# Patient Record
Sex: FEMALE | Race: WHITE | Employment: OTHER | URBAN - METROPOLITAN AREA
[De-identification: names, ages, dates, MRNs, and addresses within clinical notes are randomized per-mention and may not be internally consistent; named-entity substitution may affect disease eponyms.]

---

## 2017-01-05 ENCOUNTER — OFFICE VISIT (OUTPATIENT)
Dept: FAMILY MEDICINE CLINIC | Age: 82
End: 2017-01-05

## 2017-01-05 VITALS
SYSTOLIC BLOOD PRESSURE: 134 MMHG | RESPIRATION RATE: 18 BRPM | OXYGEN SATURATION: 95 % | WEIGHT: 154 LBS | DIASTOLIC BLOOD PRESSURE: 74 MMHG | HEIGHT: 66 IN | HEART RATE: 66 BPM | BODY MASS INDEX: 24.75 KG/M2 | TEMPERATURE: 97.9 F

## 2017-01-05 DIAGNOSIS — M25.569 KNEE PAIN, UNSPECIFIED CHRONICITY, UNSPECIFIED LATERALITY: ICD-10-CM

## 2017-01-05 DIAGNOSIS — N32.81 URGENCY-FREQUENCY SYNDROME: Primary | ICD-10-CM

## 2017-01-05 DIAGNOSIS — F03.90 DEMENTIA WITHOUT BEHAVIORAL DISTURBANCE, UNSPECIFIED DEMENTIA TYPE: ICD-10-CM

## 2017-01-05 NOTE — PROGRESS NOTES
Chief Complaint   Patient presents with    Bladder Infection     possible      Health Maintenance reviewed

## 2017-01-05 NOTE — PROGRESS NOTES
Subjective:      Patient :  80 y. o. with dementia  presents today with a chief complaint of dysuria reported by caregiver yesterday - denies complaint this morning. The patient admits to accompanying frequency yesterday. No recent constipation problem. The patient denies nausea, vomiting, and fever. The patient has no history of recent or recurrent UTIs. Some unsteadiness which caregiver blames on knees - patient complains of left knee pain, caregiver states yesterday it was right knee pain. Using aspercreme and ES tylenol. Caregiver believes knee swelling and pain is related to changes in coreg. No known history of arthritis in knees. Past Medical History   Diagnosis Date    Allergic rhinitis     Atrophic vaginitis     Basal cell carcinoma     Constipation     Dementia      lives with caregiver, Brady Quispe    Depression     Essential hypertension     GERD (gastroesophageal reflux disease)     Hypothyroidism      Current Outpatient Prescriptions   Medication Sig    lisinopril (PRINIVIL, ZESTRIL) 2.5 mg tablet Take 1 Tab by mouth daily.  memantine (NAMENDA) 10 mg tablet Take 1 Tab by mouth daily.  levothyroxine (SYNTHROID) 112 mcg tablet Takes 5 days a week. (Not Monday and Friday)    clotrimazole-betamethasone (LOTRISONE) topical cream Apply to external skin irritation 2 times per day as needed    clonazePAM (KLONOPIN) 0.5 mg tablet TAKE 1 TABLET BY MOUTH EVERY EVENING ONLY AS NEEDED FOR ANXIETY (Patient taking differently: TAKE 1 TABLET BY MOUTH EVERY DAY)    ranitidine (ZANTAC) 150 mg tablet TAKE 1 TABLET BY MOUTH TWICE DAILY FOR STOMACH ACID NEEDS APPOINTMENT    carvedilol (COREG) 6.25 mg tablet Take 1 Tab by mouth two (2) times daily (with meals). (Patient taking differently: Take 6.25 mg by mouth daily.)    citalopram (CELEXA) 20 mg tablet TAKE 2 TABLETS BY MOUTH DAILY FOR MOOD    loratadine (CLARITIN) 10 mg tablet Take 10 mg by mouth.     fluticasone (FLONASE) 50 mcg/actuation nasal spray 2 Sprays by Both Nostrils route once.  Calcium Carbonate-Vit D3-Min 600 mg (1,500 mg)-400 unit chew Take  by mouth.  docusate sodium 100 mg tab Take  by mouth.  conjugated estrogens (PREMARIN) 0.625 mg tablet Take 0.625 mg by mouth. Twice weekly    polyethylene glycol (MIRALAX) 17 gram packet Take 17 g by mouth. No current facility-administered medications for this visit. Allergies   Allergen Reactions    Sulfa (Sulfonamide Antibiotics) Not Reported This Time     Social History   Substance Use Topics    Smoking status: Never Smoker    Smokeless tobacco: Never Used    Alcohol use No       ROS per HPI. Objective:     Visit Vitals    /74 (BP 1 Location: Left arm, BP Patient Position: Sitting)    Pulse 66    Temp 97.9 °F (36.6 °C) (Oral)    Resp 18    Ht 5' 6\" (1.676 m)    Wt 154 lb (69.9 kg)    SpO2 95%    BMI 24.86 kg/m2     GEn: poor historian, unreliable answers to exam  Skin: no pallor, normal turgor  HEENT:  Normocephalic, no conjunctival inflammation, pupils equal round and reactive to light, MMM, no oral lesions  Heart: regular rate and rhythm, no murmurs, rubs or gallops  Lungs: no increased respiratory effort, clear to ausculation bilaterally  Abdomen: soft, mild suprapubic tenderness, not distended. Normal bowel sounds. No rebound or guarding. No bruits. Back: no costovertebral angle tenderness  Extremities:knees with bilateral crepitus, no significant effusion  Neuro awake and oriented        Assessment:       ICD-10-CM ICD-9-CM    1. Urgency-frequency syndrome - UA not convincing for UTI - will send culture and follow. N32.81 596.51 AMB POC URINALYSIS DIP STICK AUTO W/O MICRO      CULTURE, URINE   2. Dementia without behavioral disturbance, unspecified dementia type - moderate - severe, would benefit from caregiver's help with toileting. F03.90 294.20    3.  Knee pain, unspecified chronicity, unspecified laterality - exam consistent with arthritis - explained to caregiver very unlikely to be related to medicaiton. M25.569 719.46 XR KNEE LT MAX 2 VWS      XR KNEE RT MAX 2 VWS         Plan:     Orders Placed This Encounter    CULTURE, URINE    XR KNEE LT MAX 2 VWS     Standing Status:   Future     Standing Expiration Date:   2/5/2018     Order Specific Question:   Reason for Exam     Answer:   knee pain     Order Specific Question:   Is Patient Allergic to Contrast Dye? Answer:   No    XR KNEE RT MAX 2 VWS     Standing Status:   Future     Standing Expiration Date:   2/5/2018     Order Specific Question:   Reason for Exam     Answer:   knee pain     Order Specific Question:   Is Patient Allergic to Contrast Dye? Answer:   No    AMB POC URINALYSIS DIP STICK AUTO W/O MICRO       Verbal and written instructions (see AVS) provided. Patient expresses understanding of diagnosis and treatment plan.

## 2017-01-05 NOTE — PATIENT INSTRUCTIONS
Urine Test: About This Test  What is it? A urine test checks the color, clarity (clear or cloudy), odor, concentration, and acidity (pH) of your urine. It also checks your levels of protein, sugar, blood cells, or other substances in your urine. This test is sometimes called a urinalysis. Why is this test done? A urine test may be done:  · To check for a disease or infection of the urinary tract. The urinary tract includes the kidneys, the tubes that carry urine from the kidneys to the bladder (ureters), and the bladder. It also includes the tube that carries urine from the bladder to outside the body (urethra). · To check the treatment of conditions such as diabetes, kidney stones, a urinary tract infection (UTI), high blood pressure, or some kidney or liver diseases. How can you prepare for the test?  · Before the test, don't eat foods that can change the color of your urine. Examples of these include blackberries, beets, and rhubarb. · Don't do heavy exercise before the test.  · Tell your doctor if you are menstruating or close to starting your period. Your doctor may want to wait to do the test.  · Tell your doctor about all the nonprescription and prescription medicines and herbs or other supplements you take. Some of these can affect the results of this test.  What happens during the test?  A urine test can be done in your doctor's office, clinic, or lab. Or you may be asked to collect a urine sample at home. Then you can take it to the office or lab for testing. Clean-catch midstream urine collection  · Wash your hands before you start. · If the cup you are given has a lid, remove it carefully. Set it down with the inner surface up. Don't touch the inside of the cup with your fingers. · Clean the area around your genitals. ¨ For men: Pull back the foreskin, if present. Clean the head of your penis with medicated towelettes or swabs.   ¨ For women: Spread open the genital folds of skin with one hand. Then use medicated towelettes or swabs in your other hand to clean the area where urine comes out (the urethra). Wipe the area from front to back. · Start urinating into the toilet or urinal. A woman should hold apart the genital folds of skin while she urinates. · After the urine has flowed for several seconds, place the cup into the urine stream. Collect about 2 ounces of urine without stopping your flow of urine. · Don't touch the rim of the cup to your genital area. Don't get toilet paper, pubic hair, stool (feces), menstrual blood, or anything else in the urine sample. · Finish urinating into the toilet or urinal.  · Carefully replace and tighten the lid on the cup, and then return it to the lab. If you are collecting the urine at home and can't get it to the lab in an hour, refrigerate it. Double-voided urine sample collection  This method collects the urine your body is making right now. · Urinate into the toilet or urinal. Don't collect any of this urine. · Drink a large glass of water, and wait about 30 to 40 minutes. · Then get a urine sample. Follow the instructions above for collecting a clean-catch urine sample. · Take the urine sample to the lab. If you are collecting the urine at home and can't get it to the lab in an hour, refrigerate it. Follow-up care is a key part of your treatment and safety. Be sure to make and go to all appointments, and call your doctor if you are having problems. It's also a good idea to keep a list of the medicines you take. Ask your doctor when you can expect to have your test results. Where can you learn more? Go to http://rosy-sancho.info/. Enter R266 in the search box to learn more about \"Urine Test: About This Test.\"  Current as of: February 19, 2016  Content Version: 11.1  © 8061-7314 [employer], [employer].  Care instructions adapted under license by [employer] (which disclaims liability or warranty for this information). If you have questions about a medical condition or this instruction, always ask your healthcare professional. [place] [address] any warranty or liability for your use of this information.

## 2017-01-08 LAB — BACTERIA UR CULT: ABNORMAL

## 2017-01-11 ENCOUNTER — TELEPHONE (OUTPATIENT)
Dept: FAMILY MEDICINE CLINIC | Age: 82
End: 2017-01-11

## 2017-01-12 ENCOUNTER — TELEPHONE (OUTPATIENT)
Dept: FAMILY MEDICINE CLINIC | Age: 82
End: 2017-01-12

## 2017-01-12 ENCOUNTER — OFFICE VISIT (OUTPATIENT)
Dept: CARDIOLOGY CLINIC | Age: 82
End: 2017-01-12

## 2017-01-12 VITALS
OXYGEN SATURATION: 95 % | SYSTOLIC BLOOD PRESSURE: 150 MMHG | DIASTOLIC BLOOD PRESSURE: 70 MMHG | BODY MASS INDEX: 24.65 KG/M2 | RESPIRATION RATE: 16 BRPM | HEIGHT: 66 IN | HEART RATE: 70 BPM | WEIGHT: 153.4 LBS

## 2017-01-12 DIAGNOSIS — I10 ESSENTIAL HYPERTENSION: ICD-10-CM

## 2017-01-12 DIAGNOSIS — I50.22 SYSTOLIC CHF, CHRONIC (HCC): Primary | ICD-10-CM

## 2017-01-12 DIAGNOSIS — I48.0 PAROXYSMAL ATRIAL FIBRILLATION (HCC): ICD-10-CM

## 2017-01-12 DIAGNOSIS — F03.90 DEMENTIA WITHOUT BEHAVIORAL DISTURBANCE, UNSPECIFIED DEMENTIA TYPE: ICD-10-CM

## 2017-01-12 RX ORDER — NITROFURANTOIN (MACROCRYSTALS) 100 MG/1
100 CAPSULE ORAL 2 TIMES DAILY
Qty: 14 CAP | Refills: 0 | Status: SHIPPED | OUTPATIENT
Start: 2017-01-12 | End: 2017-01-19

## 2017-01-12 NOTE — TELEPHONE ENCOUNTER
Spoke with [employer]. Script sent to [facility].

## 2017-01-12 NOTE — TELEPHONE ENCOUNTER
Per Dr. Nay Louis notified that left and right knee x rays show arthritis and fluid in both needs. Pt need to see orthopedist.      Mickey Ordonez verbalized understanding and phone number given to her for Dr. Cyndie Fry at [phone].

## 2017-01-12 NOTE — PROGRESS NOTES
Subjective/HPI:     Paolo Menjivar is a 80 y.o. female is here for f/u appt after titrating Coreg and pt having concerns it was causing knee pain/swelling. Pt has been seen by PCP who obtained knee xrays which is showing arthritis and fluid in the right knee. She had no change in her symptoms holding or reducing the Coreg. The patient denies chest pain/ shortness of breath, palpitations, syncope, presyncope or fatigue. PCP Provider  Douglas Vazquez MD  Past Medical History   Diagnosis Date    Allergic rhinitis     Atrophic vaginitis     Basal cell carcinoma     Constipation     Dementia      lives with caregiver, Santo Emery    Depression     Essential hypertension     GERD (gastroesophageal reflux disease)     Hypothyroidism       Past Surgical History   Procedure Laterality Date    Hx cholecystectomy      Hx tonsil and adenoidectomy      Hx hysterectomy       Allergies   Allergen Reactions    Sulfa (Sulfonamide Antibiotics) Not Reported This Time      Family History   Problem Relation Age of Onset    Cancer Other       Current Outpatient Prescriptions   Medication Sig    nitrofurantoin (MACRODANTIN) 100 mg capsule Take 1 Cap by mouth two (2) times a day for 7 days.  lisinopril (PRINIVIL, ZESTRIL) 2.5 mg tablet Take 1 Tab by mouth daily.  memantine (NAMENDA) 10 mg tablet Take 1 Tab by mouth daily.  levothyroxine (SYNTHROID) 112 mcg tablet Takes 5 days a week. (Not Monday and Friday)    clonazePAM (KLONOPIN) 0.5 mg tablet TAKE 1 TABLET BY MOUTH EVERY EVENING ONLY AS NEEDED FOR ANXIETY (Patient taking differently: TAKE 1 TABLET BY MOUTH EVERY DAY)    ranitidine (ZANTAC) 150 mg tablet TAKE 1 TABLET BY MOUTH TWICE DAILY FOR STOMACH ACID NEEDS APPOINTMENT    citalopram (CELEXA) 20 mg tablet TAKE 2 TABLETS BY MOUTH DAILY FOR MOOD    loratadine (CLARITIN) 10 mg tablet Take 10 mg by mouth.     fluticasone (FLONASE) 50 mcg/actuation nasal spray 2 Sprays by Both Nostrils route once.  Calcium Carbonate-Vit D3-Min 600 mg (1,500 mg)-400 unit chew Take  by mouth.  docusate sodium 100 mg tab Take  by mouth.  conjugated estrogens (PREMARIN) 0.625 mg tablet Take 0.625 mg by mouth. Twice weekly    polyethylene glycol (MIRALAX) 17 gram packet Take 17 g by mouth.  carvedilol (COREG) 6.25 mg tablet Take 1 Tab by mouth two (2) times daily (with meals). (Patient taking differently: Take 6.25 mg by mouth daily.)     No current facility-administered medications for this visit. Vitals:    01/12/17 1428 01/12/17 1429   BP: 140/70 150/70   Pulse: 70    Resp: 16    SpO2: 95%    Weight: 153 lb 6.4 oz (69.6 kg)    Height: 5' 6\" (1.676 m)      Social History     Social History    Marital status:      Spouse name: N/A    Number of children: N/A    Years of education: N/A     Occupational History    Not on file. Social History Main Topics    Smoking status: Never Smoker    Smokeless tobacco: Never Used    Alcohol use No    Drug use: Not on file    Sexual activity: Not on file     Other Topics Concern    Not on file     Social History Narrative    Lives with caregiver, Ceci Snow       I have reviewed the nurses notes, vitals, problem list, allergy list, medical history, family, social history and medications. Review of Symptoms:    General: Pt denies excessive weight gain or loss. HEENT: Denies epistaxis and difficulty swallowing. Respiratory: Denies shortness of breath, TONEY, wheezing or stridor. Cardiovascular: Denies precordial pain, palpitations, edema   Gastrointestinal: Denies abdominal pain or blood in stool  Urinary: started abx for UTI  Musculoskeletal: +right knee pain and swelling  Neurologic: Denies new tremor, paresthesias, or sensory motor disturbance  Skin: Denies new rash, itching or texture change.   Psych: Denies depression        Physical Exam:      General: Well developed, in no acute distress, cooperative and alert  HEENT: No carotid bruits, no JVD, trach is midline. Neck Supple, PEERL, EOM intact. Heart:  Normal S1/S2 negative S3 or S4. Regularly irregular, no murmur, gallop or rub.   Respiratory: Clear bilaterally x 4, no wheezing or rales  Abdomen:   Soft, non-tender, no masses, bowel sounds are active.   Extremities:  No edema, normal cap refill, no cyanosis, atraumatic. Neuro: Alert, speech clear. Skin: Skin color is normal. Non diaphoretic  Vascular: 2+ pulses symmetric in all extremities    Cardiographics    ECG: SR with frequent PACs    Results for orders placed or performed in visit on 07/13/16   CARDIAC HOLTER MONITOR, 24 HOURS    Narrative    ECG Monitor/24 hours, Complete    Reason for Holter Monitor   JUNCTIONAL RHYTHM    Heartbeat    Slowest 46  Average 61  Fastest  100          Results:   Underlying Rhythm: Normal sinus rhythm      Atrial Arrhythmias: paroxysmal atrial fibrillation            AV Conduction: normal    Ventricular Arrhythmias: premature ventricular contractions;  frequent     ST Segment Analysis:non-specific changes     Symptom Correlation:  none    Comment:   Paroxysmal atrial fibrillation, no significant pauses. Patty Jean Baptiste MD                   Cardiology Labs:  Lab Results   Component Value Date/Time    Cholesterol, total 219 08/16/2016 12:00 AM    HDL Cholesterol 42 08/16/2016 12:00 AM    LDL, calculated 139 08/16/2016 12:00 AM    Triglyceride 189 08/16/2016 12:00 AM       Lab Results   Component Value Date/Time    Sodium 142 08/16/2016 12:00 AM    Potassium 3.9 08/16/2016 12:00 AM    Chloride 100 08/16/2016 12:00 AM    CO2 26 08/16/2016 12:00 AM    Glucose 128 08/16/2016 12:00 AM    BUN 20 08/16/2016 12:00 AM    Creatinine 1.07 08/16/2016 12:00 AM    BUN/Creatinine ratio 19 08/16/2016 12:00 AM    GFR est AA 55 08/16/2016 12:00 AM    GFR est non-AA 48 08/16/2016 12:00 AM    Calcium 9.2 08/16/2016 12:00 AM    ALT 13 08/16/2016 12:00 AM    AST 20 08/16/2016 12:00 AM    Alk. phosphatase 60 08/16/2016 12:00 AM    Protein, total 6.6 08/16/2016 12:00 AM    Albumin 4.2 08/16/2016 12:00 AM    A-G Ratio 1.8 08/16/2016 12:00 AM           Assessment:     Assessment:     Mireya Askew was seen today for follow-up. Diagnoses and all orders for this visit:    Systolic CHF, chronic (HCC)    Paroxysmal atrial fibrillation (HCC)  -     AMB POC EKG ROUTINE W/ 12 LEADS, INTER & REP    Essential hypertension    Dementia without behavioral disturbance, unspecified dementia type        ICD-10-CM ICD-9-CM    1. Systolic CHF, chronic (HCC) I50.22 428.22      428.0    2. Paroxysmal atrial fibrillation (HCC) I48.0 427.31 AMB POC EKG ROUTINE W/ 12 LEADS, INTER & REP   3. Essential hypertension I10 401.9    4. Dementia without behavioral disturbance, unspecified dementia type F03.90 294.20      Orders Placed This Encounter    AMB POC EKG ROUTINE W/ 12 LEADS, INTER & REP     Order Specific Question:   Reason for Exam:     Answer:   routine        Plan:     Patient presents today for f/u regarding CHF after reducing and holding Coreg d/t concerns that it was causing knee pain and swelling. Xrays showing likely arthritis etiology of her symptoms. No improvement in symptoms off med. I recommended she increase Coreg to 6.25mg po bid, continue Lisinopril. f/u in 2 weeks. Svetlana Womack NP      [facility]    1/12/2017         Agree with note as outlined by  NP. I confirm findings in history and physical exam. No additional findings noted. Agree with plan as outlined above.      Thelma Bueno MD

## 2017-01-13 NOTE — TELEPHONE ENCOUNTER
Pt has appt with Dr. Brenda Major on 1-30-17. They have placed her on the cancellation list and will call her if something sooner becomes available. Advised Ms. Lawrence that she could give her 600mg of Ibuprofen 3 times daily as needed. Ice and elevate legs as much as possible. Suggested that if pain gets intolerable that she could go to Ortho after hours at [facility] between 5pm and 8pm.   verbalized understanding of all instructions.

## 2017-01-24 RX ORDER — CLONAZEPAM 0.5 MG/1
TABLET ORAL
Qty: 90 TAB | Refills: 0 | Status: SHIPPED | OUTPATIENT
Start: 2017-01-24 | End: 2017-04-19 | Stop reason: SDUPTHER

## 2017-01-25 ENCOUNTER — OFFICE VISIT (OUTPATIENT)
Dept: CARDIOLOGY CLINIC | Age: 82
End: 2017-01-25

## 2017-01-25 VITALS
BODY MASS INDEX: 24.88 KG/M2 | RESPIRATION RATE: 18 BRPM | WEIGHT: 154.8 LBS | HEART RATE: 57 BPM | DIASTOLIC BLOOD PRESSURE: 70 MMHG | OXYGEN SATURATION: 97 % | HEIGHT: 66 IN | SYSTOLIC BLOOD PRESSURE: 120 MMHG

## 2017-01-25 DIAGNOSIS — I50.22 SYSTOLIC CHF, CHRONIC (HCC): ICD-10-CM

## 2017-01-25 DIAGNOSIS — I48.0 PAROXYSMAL ATRIAL FIBRILLATION (HCC): Primary | ICD-10-CM

## 2017-01-25 DIAGNOSIS — I10 ESSENTIAL HYPERTENSION: ICD-10-CM

## 2017-01-25 RX ORDER — METOPROLOL SUCCINATE 25 MG/1
25 TABLET, EXTENDED RELEASE ORAL
Qty: 30 TAB | Refills: 6 | Status: SHIPPED | OUTPATIENT
Start: 2017-01-25 | End: 2017-02-08 | Stop reason: SDUPTHER

## 2017-01-25 RX ORDER — IBUPROFEN 200 MG
200 TABLET ORAL
COMMUNITY
End: 2017-02-08

## 2017-01-25 NOTE — PROGRESS NOTES
Irina Tanner NP  Subjective/HPI:     Nasima Stephenson is a 80 y.o. female is here for follow-up for Coreg titration and concerns with exacerbating the pain as a side effect of carvedilol. PCP Provider  Yoly Avelar MD  Past Medical History   Diagnosis Date    Allergic rhinitis     Atrophic vaginitis     Basal cell carcinoma     Constipation     Dementia      lives with caregiver, Sterling Montoya    Depression     Essential hypertension     GERD (gastroesophageal reflux disease)     Hypothyroidism       Past Surgical History   Procedure Laterality Date    Hx cholecystectomy      Hx tonsil and adenoidectomy      Hx hysterectomy       Allergies   Allergen Reactions    Sulfa (Sulfonamide Antibiotics) Not Reported This Time      Family History   Problem Relation Age of Onset    Cancer Other       Current Outpatient Prescriptions   Medication Sig    ibuprofen (ADVIL) 200 mg tablet Take 200 mg by mouth every six (6) hours as needed for Pain.  metoprolol succinate (TOPROL-XL) 25 mg XL tablet Take 1 Tab by mouth nightly. Replaced Coreg    clonazePAM (KLONOPIN) 0.5 mg tablet TAKE 1 TABLET BY MOUTH EVERY EVENING ONLY AS NEEDED FOR ANXIETY    lisinopril (PRINIVIL, ZESTRIL) 2.5 mg tablet Take 1 Tab by mouth daily.  memantine (NAMENDA) 10 mg tablet Take 1 Tab by mouth daily.  levothyroxine (SYNTHROID) 112 mcg tablet Takes 5 days a week. (Not Monday and Friday)    ranitidine (ZANTAC) 150 mg tablet TAKE 1 TABLET BY MOUTH TWICE DAILY FOR STOMACH ACID NEEDS APPOINTMENT    citalopram (CELEXA) 20 mg tablet TAKE 2 TABLETS BY MOUTH DAILY FOR MOOD    loratadine (CLARITIN) 10 mg tablet Take 10 mg by mouth.  fluticasone (FLONASE) 50 mcg/actuation nasal spray 2 Sprays by Both Nostrils route once.  Calcium Carbonate-Vit D3-Min 600 mg (1,500 mg)-400 unit chew Take  by mouth.  docusate sodium 100 mg tab Take  by mouth.  conjugated estrogens (PREMARIN) 0.625 mg tablet Take 0.625 mg by mouth. Twice weekly    polyethylene glycol (MIRALAX) 17 gram packet Take 17 g by mouth. No current facility-administered medications for this visit. Vitals:    01/25/17 0931 01/25/17 0942   BP: 118/70 120/70   Pulse: (!) 57    Resp: 18    SpO2: 97%    Weight: 154 lb 12.8 oz (70.2 kg)    Height: 5' 6\" (1.676 m)      Social History     Social History    Marital status:      Spouse name: N/A    Number of children: N/A    Years of education: N/A     Occupational History    Not on file. Social History Main Topics    Smoking status: Never Smoker    Smokeless tobacco: Never Used    Alcohol use No    Drug use: Not on file    Sexual activity: Not on file     Other Topics Concern    Not on file     Social History Narrative    Lives with caregiver, Celine Sarabia       I have reviewed the nurses notes, vitals, problem list, allergy list, medical history, family, social history and medications. Review of Symptoms:    General: Pt denies excessive weight gain or loss. Reduced ADL's due to leg pain  HEENT: Denies blurred vision, headaches, epistaxis and difficulty swallowing. Respiratory: Denies shortness of breath, TONEY, wheezing or stridor. Cardiovascular: Denies precordial pain, palpitations, edema or PND  Gastrointestinal: Denies poor appetite, indigestion, abdominal pain or blood in stool  Musculoskeletal: continued knee pain  Neurologic: Denies tremor, paresthesias, or sensory motor disturbance  Skin: Denies rash, itching or texture change. Physical Exam:      General: Well developed, in no acute distress, cooperative and alert  HEENT: No carotid bruits, no JVD, trach is midline. Neck Supple, PEERL, EOM intact. Heart:  Normal S1/S2 negative S3 or S4.  Regular, no murmur, gallop or rub.   Respiratory: Clear bilaterally x 4, no wheezing or rales  Abdomen:   Soft, non-tender, no masses, bowel sounds are active.   Extremities:  Bilateral knee pain    Neuro: speech clear, gait slow and cautious   Skin: Skin color is normal. No rashes or lesions. Non diaphoretic  Vascular: 2+ pulses symmetric in all extremities    Cardiographics    ECG: sinus   Results for orders placed or performed in visit on 07/13/16   CARDIAC HOLTER MONITOR, 24 HOURS    Narrative    ECG Monitor/24 hours, Complete    Reason for Holter Monitor   JUNCTIONAL RHYTHM    Heartbeat    Slowest 46  Average 61  Fastest  100          Results:   Underlying Rhythm: Normal sinus rhythm      Atrial Arrhythmias: paroxysmal atrial fibrillation            AV Conduction: normal    Ventricular Arrhythmias: premature ventricular contractions;  frequent     ST Segment Analysis:non-specific changes     Symptom Correlation:  none    Comment:   Paroxysmal atrial fibrillation, no significant pauses. Kim Grimes MD                   Cardiology Labs:  Lab Results   Component Value Date/Time    Cholesterol, total 219 08/16/2016 12:00 AM    HDL Cholesterol 42 08/16/2016 12:00 AM    LDL, calculated 139 08/16/2016 12:00 AM    Triglyceride 189 08/16/2016 12:00 AM       Lab Results   Component Value Date/Time    Sodium 142 08/16/2016 12:00 AM    Potassium 3.9 08/16/2016 12:00 AM    Chloride 100 08/16/2016 12:00 AM    CO2 26 08/16/2016 12:00 AM    Glucose 128 08/16/2016 12:00 AM    BUN 20 08/16/2016 12:00 AM    Creatinine 1.07 08/16/2016 12:00 AM    BUN/Creatinine ratio 19 08/16/2016 12:00 AM    GFR est AA 55 08/16/2016 12:00 AM    GFR est non-AA 48 08/16/2016 12:00 AM    Calcium 9.2 08/16/2016 12:00 AM    Bilirubin, total 0.5 08/16/2016 12:00 AM    ALT 13 08/16/2016 12:00 AM    AST 20 08/16/2016 12:00 AM    Alk. phosphatase 60 08/16/2016 12:00 AM    Protein, total 6.6 08/16/2016 12:00 AM    Albumin 4.2 08/16/2016 12:00 AM    A-G Ratio 1.8 08/16/2016 12:00 AM           Assessment:     Assessment:     Angelica Hodge was seen today for irregular heart beat.     Diagnoses and all orders for this visit:    Paroxysmal atrial fibrillation (HCC)  -     AMB POC EKG ROUTINE W/ 12 LEADS, INTER & REP    Systolic CHF, chronic (HCC)    Essential hypertension    Other orders  -     metoprolol succinate (TOPROL-XL) 25 mg XL tablet; Take 1 Tab by mouth nightly. Replaced Coreg        ICD-10-CM ICD-9-CM    1. Paroxysmal atrial fibrillation (HCC) I48.0 427.31 AMB POC EKG ROUTINE W/ 12 LEADS, INTER & REP   2. Systolic CHF, chronic (HCC) I50.22 428.22      428.0    3. Essential hypertension I10 401.9      Orders Placed This Encounter    AMB POC EKG ROUTINE W/ 12 LEADS, INTER & REP     Order Specific Question:   Reason for Exam:     Answer:   routine    ibuprofen (ADVIL) 200 mg tablet     Sig: Take 200 mg by mouth every six (6) hours as needed for Pain.  metoprolol succinate (TOPROL-XL) 25 mg XL tablet     Sig: Take 1 Tab by mouth nightly. Replaced Coreg     Dispense:  30 Tab     Refill:  6        Plan:     Patient presents with continued reports of bilateral knee pain that has some correlation according to the patient and family members with the use of carvedilol. Up-to-date reports a 1-6% incidence of arthralgias and arthritis with carvedilol versus a less than 1% with metoprolol. Has follow-up with orthopedics,  will change carvedilol to metoprolol XL 25 mg at night and follow-up in 1 month. Both patient and family are in high agreement with this plan. [address] [facility]    1/25/2017         Agree with note as outlined by  NP. I confirm findings in history and physical exam. No additional findings noted. Agree with plan as outlined above.      Alia Massey MD

## 2017-01-25 NOTE — MR AVS SNAPSHOT
Visit Information Date & Time Provider Department Dept. Phone Encounter #  
 1/25/2017  9:30 AM [address], [place] [facility] [phone] [id number] Upcoming Health Maintenance Date Due DTaP/Tdap/Td series (1 - Tdap) 2/1/1953 GLAUCOMA SCREENING Q2Y 2/1/1997 MEDICARE YEARLY EXAM 2/1/1997 Allergies as of 1/25/2017  Review Complete On: 1/25/2017 By: Kim Foley NP Severity Noted Reaction Type Reactions Sulfa (Sulfonamide Antibiotics)  06/21/2016    Not Reported This Time Current Immunizations  Reviewed on 6/6/2016 Name Date Influenza High Dose Vaccine PF 10/3/2016 Pneumococcal Conjugate (PCV-13) 6/6/2016 Pneumococcal Polysaccharide (PPSV-23) 11/3/2014 Zoster Vaccine, Live 12/3/2012 Not reviewed this visit You Were Diagnosed With   
  
 Codes Comments Paroxysmal atrial fibrillation (HCC)    -  Primary ICD-10-CM: I48.0 ICD-9-CM: 427.31 Systolic CHF, chronic (HCC)     ICD-10-CM: I50.22 ICD-9-CM: 428.22, 428.0 Essential hypertension     ICD-10-CM: I10 
ICD-9-CM: 401.9 Vitals BP Pulse Resp Height(growth percentile) Weight(growth percentile) SpO2  
 120/70 (BP 1 Location: Right arm, BP Patient Position: Sitting) (!) 57 18 5' 6\" (1.676 m) 154 lb 12.8 oz (70.2 kg) 97% BMI OB Status Smoking Status 24.99 kg/m2 Hysterectomy Never Smoker Vitals History BMI and BSA Data Body Mass Index Body Surface Area 24.99 kg/m 2 1.81 m 2 Preferred Pharmacy Pharmacy Name Phone [facility] [address] [phone] Your Updated Medication List  
  
   
This list is accurate as of: 1/25/17 10:24 AM.  Always use your most recent med list. ADVIL 200 mg tablet Generic drug:  ibuprofen Take 200 mg by mouth every six (6) hours as needed for Pain. Calcium Carbonate-Vit D3-Min 600 mg (1,500 mg)-400 unit Chew Take  by mouth.  
  
 citalopram 20 mg tablet Commonly known as:  CELEXA  
TAKE 2 TABLETS BY MOUTH DAILY FOR MOOD  
  
 clonazePAM 0.5 mg tablet Commonly known as:  KlonoPIN  
TAKE 1 TABLET BY MOUTH EVERY EVENING ONLY AS NEEDED FOR ANXIETY  
  
 conjugated estrogens 0.625 mg tablet Commonly known as:  PREMARIN Take 0.625 mg by mouth. Twice weekly  
  
 docusate sodium 100 mg Tab Take  by mouth. fluticasone 50 mcg/actuation nasal spray Commonly known as:  Domnick Means 2 Sprays by Both Nostrils route once. levothyroxine 112 mcg tablet Commonly known as:  SYNTHROID Takes 5 days a week. (Not Monday and Friday)  
  
 lisinopril 2.5 mg tablet Commonly known as:  Silvia [place] Take 1 Tab by mouth daily. loratadine 10 mg tablet Commonly known as:  Belkys Baas Take 10 mg by mouth.  
  
 memantine 10 mg tablet Commonly known as:  Sara Pavon Take 1 Tab by mouth daily. metoprolol succinate 25 mg XL tablet Commonly known as:  TOPROL-XL Take 1 Tab by mouth nightly. Replaced Coreg MIRALAX 17 gram packet Generic drug:  polyethylene glycol Take 17 g by mouth. raNITIdine 150 mg tablet Commonly known as:  ZANTAC TAKE 1 TABLET BY MOUTH TWICE DAILY FOR STOMACH ACID NEEDS APPOINTMENT Prescriptions Sent to Pharmacy Refills  
 metoprolol succinate (TOPROL-XL) 25 mg XL tablet 6 Sig: Take 1 Tab by mouth nightly. Replaced Coreg Class: Normal  
 Pharmacy: [address] #: [phone] Route: Oral  
  
We Performed the Following AMB POC EKG ROUTINE W/ 12 LEADS, INTER & REP [43581 CPT(R)] Please provide this summary of care documentation to your next provider. Your primary care clinician is listed as Valentín Mtz. If you have any questions after today's visit, please call [phone].

## 2017-01-29 RX ORDER — FLUTICASONE PROPIONATE 50 MCG
SPRAY, SUSPENSION (ML) NASAL
Qty: 1 BOTTLE | Refills: 11 | Status: SHIPPED | OUTPATIENT
Start: 2017-01-29

## 2017-01-31 RX ORDER — FLUTICASONE PROPIONATE 50 MCG
SPRAY, SUSPENSION (ML) NASAL
Qty: 1 BOTTLE | Refills: 3 | Status: SHIPPED | OUTPATIENT
Start: 2017-01-31 | End: 2017-02-08

## 2017-02-08 ENCOUNTER — OFFICE VISIT (OUTPATIENT)
Dept: FAMILY MEDICINE CLINIC | Age: 82
End: 2017-02-08

## 2017-02-08 VITALS
WEIGHT: 156 LBS | HEART RATE: 69 BPM | OXYGEN SATURATION: 95 % | DIASTOLIC BLOOD PRESSURE: 68 MMHG | BODY MASS INDEX: 25.07 KG/M2 | HEIGHT: 66 IN | SYSTOLIC BLOOD PRESSURE: 126 MMHG | TEMPERATURE: 98.5 F

## 2017-02-08 DIAGNOSIS — F03.90 DEMENTIA WITHOUT BEHAVIORAL DISTURBANCE, UNSPECIFIED DEMENTIA TYPE: ICD-10-CM

## 2017-02-08 DIAGNOSIS — M17.0 ARTHRITIS OF BOTH KNEES: ICD-10-CM

## 2017-02-08 DIAGNOSIS — R41.0 DELIRIUM: Primary | ICD-10-CM

## 2017-02-08 DIAGNOSIS — I48.0 PAROXYSMAL ATRIAL FIBRILLATION (HCC): ICD-10-CM

## 2017-02-08 DIAGNOSIS — N18.30 CHRONIC KIDNEY DISEASE, STAGE III (MODERATE) (HCC): ICD-10-CM

## 2017-02-08 RX ORDER — FLUTICASONE PROPIONATE 50 MCG
2 SPRAY, SUSPENSION (ML) NASAL DAILY
COMMUNITY
End: 2017-06-14 | Stop reason: SDUPTHER

## 2017-02-08 RX ORDER — MELOXICAM 15 MG/1
15 TABLET ORAL DAILY
COMMUNITY
End: 2017-05-05

## 2017-02-08 RX ORDER — METOPROLOL SUCCINATE 25 MG/1
12.5 TABLET, EXTENDED RELEASE ORAL
Qty: 30 TAB | Refills: 6
Start: 2017-02-08 | End: 2017-09-20 | Stop reason: SDUPTHER

## 2017-02-08 NOTE — PROGRESS NOTES
Chief Complaint   Patient presents with   Dari Slight    Altered mental status     Health Maintenance reviewed

## 2017-02-08 NOTE — PROGRESS NOTES
HPI:  80 y.o. With baseline dementia presents with caregiver who reports delirious behavior overnight. Last night was unable to sleep and wandered the halls with caregiver. She was hallucinating. She fell. No fever, no cough, no abdominal pain. Moving bowels and passing urine without a problem. No change in appetite. Bilateral knee pain - had both knees injected 1/30 and felt much better. Now residual pain in left. Taking mobic after breakfast or lunch. HTN - switched from coreg to metoprolol. Taking metoprolol at night and since starting     Past Medical History   Diagnosis Date    Allergic rhinitis     Atrophic vaginitis     Basal cell carcinoma     Constipation     Dementia      lives with caregiver, Windy Quigley    Depression     Essential hypertension     GERD (gastroesophageal reflux disease)     Hypothyroidism        Social History   Substance Use Topics    Smoking status: Never Smoker    Smokeless tobacco: Never Used    Alcohol use No       Outpatient Prescriptions Marked as Taking for the 2/8/17 encounter (Office Visit) with Nabeel Estrada MD   Medication Sig Dispense Refill    meloxicam (MOBIC) 15 mg tablet Take 15 mg by mouth daily.  fluticasone (FLONASE) 50 mcg/actuation nasal spray 2 Sprays by Both Nostrils route daily.  metoprolol succinate (TOPROL-XL) 25 mg XL tablet Take 0.5 Tabs by mouth nightly. Replaced Coreg 30 Tab 6    fluticasone (FLONASE) 50 mcg/actuation nasal spray INSTILL 2 SPRAYS IN EACH NOSTIRL ONCE DAILY 1 Bottle 11    clonazePAM (KLONOPIN) 0.5 mg tablet TAKE 1 TABLET BY MOUTH EVERY EVENING ONLY AS NEEDED FOR ANXIETY 90 Tab 0    lisinopril (PRINIVIL, ZESTRIL) 2.5 mg tablet Take 1 Tab by mouth daily. 30 Tab 3    memantine (NAMENDA) 10 mg tablet Take 1 Tab by mouth daily. 90 Tab 3    levothyroxine (SYNTHROID) 112 mcg tablet Takes 5 days a week.  (Not Monday and Friday) 75 Tab 3    ranitidine (ZANTAC) 150 mg tablet TAKE 1 TABLET BY MOUTH TWICE DAILY FOR STOMACH ACID NEEDS APPOINTMENT 60 Tab 11    citalopram (CELEXA) 20 mg tablet TAKE 2 TABLETS BY MOUTH DAILY FOR MOOD 180 Tab 3    loratadine (CLARITIN) 10 mg tablet Take 10 mg by mouth.  Calcium Carbonate-Vit D3-Min 600 mg (1,500 mg)-400 unit chew Take  by mouth.  docusate sodium 100 mg tab Take  by mouth.  conjugated estrogens (PREMARIN) 0.625 mg tablet Take 0.625 mg by mouth. Twice weekly      polyethylene glycol (MIRALAX) 17 gram packet Take 17 g by mouth. Allergies   Allergen Reactions    Sulfa (Sulfonamide Antibiotics) Not Reported This Time       ROS:  Patient unreliable with symptoms, all history from caregiver    PE:  Visit Vitals    /68 (BP 1 Location: Left arm, BP Patient Position: Sitting)    Pulse 69    Temp 98.5 °F (36.9 °C) (Oral)    Ht 5' 6\" (1.676 m)    Wt 156 lb (70.8 kg)    SpO2 95%    BMI 25.18 kg/m2     Gen: awake no acute distress  Head: normocephalic, atraumatic  Eyes: pupils equal round reactive to light, sclera clear, conjunctiva clear  Oral: moist mucus membranes, no oral lesions, no pharyngeal inflammation or exudate  Resp: no increase work of breathing, lungs clear to ausculation bilaterally, no wheezing, rales or rhonchi  CV: S1, S2 normal.  No murmurs, rubs, or gallops. Abd: soft, not tender, not distended. Normal bowel sounds. .  Neuro: cranial nerves intact, normal strength and movement in all extremities, reflexes and sensation intact and symmetric. Skin: no lesion or rash  Extremities: no cyanosis or edema      Assessment/Plan:  Delirium overnight - no signs of acute illness, check labs for any metabolic derangement or infection. Decrease metoprolol to 12.5mg to be sure she's not getting hypotensive. Discussed use of second clonazepam if unable to sleep tonight and discussed future role for haldol. I doubt meloxicam played any part in the problem - and fixing knee pain actually removes a source of confusion. ICD-10-CM ICD-9-CM    1. Delirium R41.0 780.09 AMB POC URINALYSIS DIP STICK AUTO W/O MICRO      CBC WITH AUTOMATED DIFF      METABOLIC PANEL, COMPREHENSIVE      TSH 3RD GENERATION       Medications Discontinued During This Encounter   Medication Reason    fluticasone (FLONASE) 50 mcg/actuation nasal spray Not A Current Medication    fluticasone (FLONASE) 50 mcg/actuation nasal spray Duplicate Order    ibuprofen (ADVIL) 200 mg tablet Not A Current Medication    metoprolol succinate (TOPROL-XL) 25 mg XL tablet Reorder         Current Outpatient Prescriptions   Medication Sig    meloxicam (MOBIC) 15 mg tablet Take 15 mg by mouth daily.  fluticasone (FLONASE) 50 mcg/actuation nasal spray 2 Sprays by Both Nostrils route daily.  metoprolol succinate (TOPROL-XL) 25 mg XL tablet Take 0.5 Tabs by mouth nightly. Replaced Coreg    fluticasone (FLONASE) 50 mcg/actuation nasal spray INSTILL 2 SPRAYS IN EACH NOSTIRL ONCE DAILY    clonazePAM (KLONOPIN) 0.5 mg tablet TAKE 1 TABLET BY MOUTH EVERY EVENING ONLY AS NEEDED FOR ANXIETY    lisinopril (PRINIVIL, ZESTRIL) 2.5 mg tablet Take 1 Tab by mouth daily.  memantine (NAMENDA) 10 mg tablet Take 1 Tab by mouth daily.  levothyroxine (SYNTHROID) 112 mcg tablet Takes 5 days a week. (Not Monday and Friday)    ranitidine (ZANTAC) 150 mg tablet TAKE 1 TABLET BY MOUTH TWICE DAILY FOR STOMACH ACID NEEDS APPOINTMENT    citalopram (CELEXA) 20 mg tablet TAKE 2 TABLETS BY MOUTH DAILY FOR MOOD    loratadine (CLARITIN) 10 mg tablet Take 10 mg by mouth.  Calcium Carbonate-Vit D3-Min 600 mg (1,500 mg)-400 unit chew Take  by mouth.  docusate sodium 100 mg tab Take  by mouth.  conjugated estrogens (PREMARIN) 0.625 mg tablet Take 0.625 mg by mouth. Twice weekly    polyethylene glycol (MIRALAX) 17 gram packet Take 17 g by mouth. No current facility-administered medications for this visit.         Recommended healthy diet low in carbohydrates, fats, sodium and cholesterol. Recommended regular cardiovascular exercise 3-6 times per week for 30-60 minutes daily. Verbal and written instructions (see AVS) provided. Patient expresses understanding of diagnosis and treatment plan.

## 2017-02-08 NOTE — TELEPHONE ENCOUNTER
Caregiver concerned about medications. States patient has woken up 2 times in the past week in the middle of the night with confusion. Pt fell last night, unsure of injury as caregiver did not witness fall. appt scheduled today with Dr. Kareen Maldonado at 1566.

## 2017-02-08 NOTE — TELEPHONE ENCOUNTER
Patient's caregiver calling to speak with Dr Vazquez Do. she says that patient did not sleep at all on the new meds she was prescribed and was seeing what she should do from here.  She can be reached at [phone]

## 2017-02-08 NOTE — MR AVS SNAPSHOT
Visit Information Date & Time Provider Department Dept. Phone Encounter #  
 2[facility]/2017 11:30 [facility] [phone] [id number] Follow-up Instructions Return in about 1 day (around 2/9/2017). Your Appointments 2/22/2017 11:30 AM  
1 MONTH with Wilfredo Richardson MD  
[facility]) Appt Note: Per Dr Kaylin Belle $0CP REM  
 [address] [facility] [address]  
[phone] [address] [facility] [address] Upcoming Health Maintenance Date Due DTaP/Tdap/Td series (1 - Tdap) 2[birth year]1/1953 GLAUCOMA SCREENING Q2Y 2/1/1997 MEDICARE YEARLY EXAM 2/1/1997 Allergies as of 2/8/2017  Review Complete On: 2/8/2017 By: Sami Buchanan MD  
  
 Severity Noted Reaction Type Reactions Sulfa (Sulfonamide Antibiotics)  06/21/2016    Not Reported This Time Current Immunizations  Reviewed on 6/6/2016 Name Date Influenza High Dose Vaccine PF 10/3/2016 Pneumococcal Conjugate (PCV-13) 6/6/2016 Pneumococcal Polysaccharide (PPSV-23) 11/3/2014 Zoster Vaccine, Live 12/3/2012 Not reviewed this visit You Were Diagnosed With   
  
 Codes Comments Delirium    -  Primary ICD-10-CM: R41.0 ICD-9-CM: 780.09 Vitals BP Pulse Temp Height(growth percentile) Weight(growth percentile) SpO2  
 126/68 (BP 1 Location: Left arm, BP Patient Position: Sitting) 69 98.5 °F (36.9 °C) (Oral) 5' 6\" (1.676 m) 156 lb (70.8 kg) 95% BMI OB Status Smoking Status 25.18 kg/m2 Hysterectomy Never Smoker Vitals History BMI and BSA Data Body Mass Index Body Surface Area  
 25.18 kg/m 2 1.82 m 2 Preferred Pharmacy Pharmacy Name Phone [facility], [address] [phone] Your Updated Medication List  
  
   
 This list is accurate as of: 2/8/17 12:53 PM.  Always use your most recent med list.  
  
  
  
  
 Calcium Carbonate-Vit D3-Min 600 mg (1,500 mg)-400 unit Chew Take  by mouth.  
  
 citalopram 20 mg tablet Commonly known as:  CELEXA  
TAKE 2 TABLETS BY MOUTH DAILY FOR MOOD  
  
 clonazePAM 0.5 mg tablet Commonly known as:  KlonoPIN  
TAKE 1 TABLET BY MOUTH EVERY EVENING ONLY AS NEEDED FOR ANXIETY  
  
 conjugated estrogens 0.625 mg tablet Commonly known as:  PREMARIN Take 0.625 mg by mouth. Twice weekly  
  
 docusate sodium 100 mg Tab Take  by mouth. * fluticasone 50 mcg/actuation nasal spray Commonly known as:  Caffie Euler 2 Sprays by Both Nostrils route daily. * fluticasone 50 mcg/actuation nasal spray Commonly known as:  Caffie Euler INSTILL 2 SPRAYS IN EACH NOSTIRL ONCE DAILY  
  
 levothyroxine 112 mcg tablet Commonly known as:  SYNTHROID Takes 5 days a week. (Not Monday and Friday)  
  
 lisinopril 2.5 mg tablet Commonly known as:  Aundra Fred Take 1 Tab by mouth daily. loratadine 10 mg tablet Commonly known as:  Boy William Take 10 mg by mouth.  
  
 memantine 10 mg tablet Commonly known as:  Grazyna Media Take 1 Tab by mouth daily. metoprolol succinate 25 mg XL tablet Commonly known as:  TOPROL-XL Take 0.5 Tabs by mouth nightly. Replaced Coreg MIRALAX 17 gram packet Generic drug:  polyethylene glycol Take 17 g by mouth. MOBIC 15 mg tablet Generic drug:  meloxicam  
Take 15 mg by mouth daily. raNITIdine 150 mg tablet Commonly known as:  ZANTAC TAKE 1 TABLET BY MOUTH TWICE DAILY FOR STOMACH ACID NEEDS APPOINTMENT * Notice: This list has 2 medication(s) that are the same as other medications prescribed for you. Read the directions carefully, and ask your doctor or other care provider to review them with you. We Performed the Following AMB POC URINALYSIS DIP STICK AUTO W/O MICRO [45587 CPT(R)] CBC WITH AUTOMATED DIFF [77348 CPT(R)] METABOLIC PANEL, COMPREHENSIVE [64081 CPT(R)] TSH 3RD GENERATION [22894 CPT(R)] Follow-up Instructions Return in about 1 day (around 2/9/2017). Patient Instructions We are checking labs to look for any underlying causes of delirium overnight. Decrease metoprolol to 12.5mg tonight. OK to give extra clonazepam if up tonight. In the future we may need to consider haloperidol. Please provide this summary of care documentation to your next provider. Your primary care clinician is listed as Khalida Peters. If you have any questions after today's visit, please call [phone].

## 2017-02-08 NOTE — PATIENT INSTRUCTIONS
We are checking labs to look for any underlying causes of delirium overnight. Decrease metoprolol to 12.5mg tonight. OK to give extra clonazepam if up tonight. In the future we may need to consider haloperidol.

## 2017-02-09 ENCOUNTER — CLINICAL SUPPORT (OUTPATIENT)
Dept: FAMILY MEDICINE CLINIC | Age: 82
End: 2017-02-09

## 2017-02-09 VITALS
OXYGEN SATURATION: 95 % | SYSTOLIC BLOOD PRESSURE: 122 MMHG | BODY MASS INDEX: 24.75 KG/M2 | WEIGHT: 154 LBS | DIASTOLIC BLOOD PRESSURE: 85 MMHG | HEART RATE: 73 BPM | HEIGHT: 66 IN

## 2017-02-09 DIAGNOSIS — Z01.30 BP CHECK: Primary | ICD-10-CM

## 2017-02-09 LAB
ALBUMIN SERPL-MCNC: 3.9 G/DL (ref 3.5–4.7)
ALBUMIN/GLOB SERPL: 1.5 {RATIO} (ref 1.1–2.5)
ALP SERPL-CCNC: 72 IU/L (ref 39–117)
ALT SERPL-CCNC: 16 IU/L (ref 0–32)
AST SERPL-CCNC: 16 IU/L (ref 0–40)
BASOPHILS # BLD AUTO: 0 X10E3/UL (ref 0–0.2)
BASOPHILS NFR BLD AUTO: 0 %
BILIRUB SERPL-MCNC: 0.5 MG/DL (ref 0–1.2)
BILIRUB UR QL STRIP: NEGATIVE
BUN SERPL-MCNC: 21 MG/DL (ref 8–27)
BUN/CREAT SERPL: 22 (ref 11–26)
CALCIUM SERPL-MCNC: 9.1 MG/DL (ref 8.7–10.3)
CHLORIDE SERPL-SCNC: 100 MMOL/L (ref 96–106)
CO2 SERPL-SCNC: 29 MMOL/L (ref 18–29)
CREAT SERPL-MCNC: 0.95 MG/DL (ref 0.57–1)
EOSINOPHIL # BLD AUTO: 0.1 X10E3/UL (ref 0–0.4)
EOSINOPHIL NFR BLD AUTO: 2 %
ERYTHROCYTE [DISTWIDTH] IN BLOOD BY AUTOMATED COUNT: 14.5 % (ref 12.3–15.4)
GLOBULIN SER CALC-MCNC: 2.6 G/DL (ref 1.5–4.5)
GLUCOSE SERPL-MCNC: 107 MG/DL (ref 65–99)
GLUCOSE UR-MCNC: NEGATIVE MG/DL
HCT VFR BLD AUTO: 39.9 % (ref 34–46.6)
HGB BLD-MCNC: 13.2 G/DL (ref 11.1–15.9)
IMM GRANULOCYTES # BLD: 0 X10E3/UL (ref 0–0.1)
IMM GRANULOCYTES NFR BLD: 0 %
INTERPRETATION: NORMAL
KETONES P FAST UR STRIP-MCNC: NEGATIVE MG/DL
LYMPHOCYTES # BLD AUTO: 1.6 X10E3/UL (ref 0.7–3.1)
LYMPHOCYTES NFR BLD AUTO: 31 %
MCH RBC QN AUTO: 30 PG (ref 26.6–33)
MCHC RBC AUTO-ENTMCNC: 33.1 G/DL (ref 31.5–35.7)
MCV RBC AUTO: 91 FL (ref 79–97)
MONOCYTES # BLD AUTO: 0.5 X10E3/UL (ref 0.1–0.9)
MONOCYTES NFR BLD AUTO: 9 %
NEUTROPHILS # BLD AUTO: 3.1 X10E3/UL (ref 1.4–7)
NEUTROPHILS NFR BLD AUTO: 58 %
PH UR STRIP: 7 [PH] (ref 4.6–8)
PLATELET # BLD AUTO: 162 X10E3/UL (ref 150–379)
POTASSIUM SERPL-SCNC: 3.6 MMOL/L (ref 3.5–5.2)
PROT SERPL-MCNC: 6.5 G/DL (ref 6–8.5)
PROT UR QL STRIP: NEGATIVE MG/DL
RBC # BLD AUTO: 4.4 X10E6/UL (ref 3.77–5.28)
SODIUM SERPL-SCNC: 142 MMOL/L (ref 134–144)
SP GR UR STRIP: 1.01 (ref 1–1.03)
TSH SERPL DL<=0.005 MIU/L-ACNC: 2.37 UIU/ML (ref 0.45–4.5)
UA UROBILINOGEN AMB POC: NORMAL (ref 0.2–1)
URINALYSIS CLARITY POC: CLEAR
URINALYSIS COLOR POC: YELLOW
URINE BLOOD POC: NEGATIVE
URINE LEUKOCYTES POC: NEGATIVE
URINE NITRITES POC: NEGATIVE
WBC # BLD AUTO: 5.3 X10E3/UL (ref 3.4–10.8)

## 2017-02-09 NOTE — PROGRESS NOTES
Presents for BP check and to drop of UA ordered by Dr. Anoop Schilling yesterday. UA was normal and BP was 122/85. Daughter notified of both and continue with present medications as ordered. She said Dr. Anoop Schilling stopped the Metoprolol yesterday and that is why she is here. See med list below:  Prior to Admission medications    Medication Sig Start Date End Date Taking? Authorizing Provider   meloxicam (MOBIC) 15 mg tablet Take 15 mg by mouth daily. Yes Historical Provider   fluticasone (FLONASE) 50 mcg/actuation nasal spray 2 Sprays by Both Nostrils route daily. Yes Historical Provider   fluticasone (FLONASE) 50 mcg/actuation nasal spray INSTILL 2 SPRAYS IN EACH NOSTIRL ONCE DAILY 1/29/17  Yes Nabeel Estrada MD   clonazePAM (KLONOPIN) 0.5 mg tablet TAKE 1 TABLET BY MOUTH EVERY EVENING ONLY AS NEEDED FOR ANXIETY 1/24/17  Yes Nabeel Estrada MD   lisinopril (PRINIVIL, ZESTRIL) 2.5 mg tablet Take 1 Tab by mouth daily. 12/6/16  Yes Aby UMANA MD   memantine (NAMENDA) 10 mg tablet Take 1 Tab by mouth daily. 11/17/16  Yes Nabeel Estrada MD   levothyroxine (SYNTHROID) 112 mcg tablet Takes 5 days a week. (Not Monday and Friday) 11/11/16  Yes Nabeel Estrada MD   ranitidine (ZANTAC) 150 mg tablet TAKE 1 TABLET BY MOUTH TWICE DAILY FOR STOMACH ACID NEEDS APPOINTMENT 10/11/16  Yes Nabeel Estrada MD   citalopram (CELEXA) 20 mg tablet TAKE 2 TABLETS BY MOUTH DAILY FOR MOOD 7/18/16  Yes Nabeel Estrada MD   loratadine (CLARITIN) 10 mg tablet Take 10 mg by mouth. Yes Historical Provider   Calcium Carbonate-Vit D3-Min 600 mg (1,500 mg)-400 unit chew Take  by mouth. Yes Historical Provider   docusate sodium 100 mg tab Take  by mouth. Yes Historical Provider   conjugated estrogens (PREMARIN) 0.625 mg tablet Take 0.625 mg by mouth. Twice weekly   Yes Historical Provider   polyethylene glycol (MIRALAX) 17 gram packet Take 17 g by mouth.    Yes Historical Provider   metoprolol succinate (TOPROL-XL) 25 mg XL tablet Take 0.5 Tabs by mouth nightly. Replaced Coreg 2/8/17   Orly Tanner MD     Explained that she will not take the metoprolol as Dr. Keny Eubanks instructed. Also she had questions about taking the Mobic in the morning and the Celexa. She said she would give both after breakfast. I suggested try it one at a time and if she tolerates it with out nausea then move the other to after breakfast also. She voiced understanding.

## 2017-02-10 ENCOUNTER — TELEPHONE (OUTPATIENT)
Dept: FAMILY MEDICINE CLINIC | Age: 82
End: 2017-02-10

## 2017-02-14 NOTE — PROGRESS NOTES
Jody Lawrence notified and verbalized understanding. Jody states that the past couple of nights have been great.

## 2017-02-22 ENCOUNTER — OFFICE VISIT (OUTPATIENT)
Dept: CARDIOLOGY CLINIC | Age: 82
End: 2017-02-22

## 2017-02-22 VITALS
HEIGHT: 66 IN | OXYGEN SATURATION: 96 % | WEIGHT: 152 LBS | SYSTOLIC BLOOD PRESSURE: 140 MMHG | DIASTOLIC BLOOD PRESSURE: 80 MMHG | RESPIRATION RATE: 16 BRPM | BODY MASS INDEX: 24.43 KG/M2 | HEART RATE: 50 BPM

## 2017-02-22 DIAGNOSIS — E03.9 HYPOTHYROIDISM, UNSPECIFIED TYPE: ICD-10-CM

## 2017-02-22 DIAGNOSIS — I10 ESSENTIAL HYPERTENSION: ICD-10-CM

## 2017-02-22 DIAGNOSIS — I48.0 PAROXYSMAL ATRIAL FIBRILLATION (HCC): ICD-10-CM

## 2017-02-22 DIAGNOSIS — I50.22 SYSTOLIC CHF, CHRONIC (HCC): Primary | ICD-10-CM

## 2017-02-22 DIAGNOSIS — F03.90 DEMENTIA WITHOUT BEHAVIORAL DISTURBANCE, UNSPECIFIED DEMENTIA TYPE: ICD-10-CM

## 2017-02-22 NOTE — PROGRESS NOTES
Subjective/HPI:     Kassie Paget is a 80 y.o. female is here for f/u appt. ROS per caregiver d/t dementia. She reports that she was seen by ortho, had injections in knees and is getting p/t. She was also placed on Mobic and has had improvement in her knee pain. She saw Dr Arely Kruse in Feb for a night of confusion and Dr Arely Kruse decreased her Metoprolol to 1/2 tab. So far she has been doing well overall. She denies noted chest pain/ shortness of breath, LE edema, or syncope. PCP Provider  Monserrat Nagel MD  Past Medical History:   Diagnosis Date    Allergic rhinitis     Atrophic vaginitis     Basal cell carcinoma     Constipation     Dementia     lives with caregiver, Kota Stanley    Depression     Essential hypertension     GERD (gastroesophageal reflux disease)     Hypothyroidism       Past Surgical History:   Procedure Laterality Date    HX CHOLECYSTECTOMY      HX HYSTERECTOMY      HX TONSIL AND ADENOIDECTOMY       Allergies   Allergen Reactions    Sulfa (Sulfonamide Antibiotics) Not Reported This Time      Family History   Problem Relation Age of Onset    Cancer Other       Current Outpatient Prescriptions   Medication Sig    meloxicam (MOBIC) 15 mg tablet Take 15 mg by mouth daily.  fluticasone (FLONASE) 50 mcg/actuation nasal spray 2 Sprays by Both Nostrils route daily.  metoprolol succinate (TOPROL-XL) 25 mg XL tablet Take 0.5 Tabs by mouth nightly. Replaced Coreg    fluticasone (FLONASE) 50 mcg/actuation nasal spray INSTILL 2 SPRAYS IN EACH NOSTIRL ONCE DAILY    clonazePAM (KLONOPIN) 0.5 mg tablet TAKE 1 TABLET BY MOUTH EVERY EVENING ONLY AS NEEDED FOR ANXIETY    lisinopril (PRINIVIL, ZESTRIL) 2.5 mg tablet Take 1 Tab by mouth daily.  memantine (NAMENDA) 10 mg tablet Take 1 Tab by mouth daily.  levothyroxine (SYNTHROID) 112 mcg tablet Takes 5 days a week.  (Not Monday and Friday)    ranitidine (ZANTAC) 150 mg tablet TAKE 1 TABLET BY MOUTH TWICE DAILY FOR STOMACH ACID NEEDS APPOINTMENT    citalopram (CELEXA) 20 mg tablet TAKE 2 TABLETS BY MOUTH DAILY FOR MOOD    loratadine (CLARITIN) 10 mg tablet Take 10 mg by mouth.  Calcium Carbonate-Vit D3-Min 600 mg (1,500 mg)-400 unit chew Take  by mouth.  docusate sodium 100 mg tab Take  by mouth.  conjugated estrogens (PREMARIN) 0.625 mg tablet Take 0.625 mg by mouth. Twice weekly    polyethylene glycol (MIRALAX) 17 gram packet Take 17 g by mouth. No current facility-administered medications for this visit. Vitals:    02/22/17 1142 02/22/17 1146   BP: 150/80 140/80   Pulse: (!) 50    Resp: 16    SpO2: 96%    Weight: 152 lb (68.9 kg)    Height: 5' 6\" (1.676 m)      Social History     Social History    Marital status:      Spouse name: N/A    Number of children: N/A    Years of education: N/A     Occupational History    Not on file. Social History Main Topics    Smoking status: Never Smoker    Smokeless tobacco: Never Used    Alcohol use No    Drug use: Not on file    Sexual activity: Not on file     Other Topics Concern    Not on file     Social History Narrative    Lives with caregiver, Judson Denise       I have reviewed the nurses notes, vitals, problem list, allergy list, medical history, family, social history and medications. Review of Symptoms: per caregiver    General: Denies excessive weight gain or loss. HEENT: Denies epistaxis and difficulty swallowing. Respiratory: Denies shortness of breath, TONEY  Cardiovascular: Denies precordial pain or edema   Gastrointestinal: Denies abdominal pain or blood in stool  Urinary: Denies new onset of dysuria, pyuria  Musculoskeletal: Denies worsening knee pain or swelling from muscles or joints  Neurologic: Denies new tremor, paresthesias, or sensory motor disturbance  Skin: Denies new rash, itching or texture change.   Psych: Denies depression        Physical Exam:      General: Well developed, in no acute distress, cooperative and alert  HEENT: No carotid bruits, no JVD, trach is midline. Neck Supple, PEERL, EOM intact. Heart:  Normal S1/S2 negative S3 or S4. irregular, no murmur, gallop or rub.   Respiratory: Clear bilaterally x 4, no wheezing or rales  Abdomen:   Soft, non-tender, no masses, bowel sounds are active.   Extremities:  No edema, normal cap refill, no cyanosis, atraumatic. Neuro: alert, speech clear, gait stable. Skin: Skin color is normal. No rashes or lesions. Non diaphoretic  Vascular: 2+ pulses symmetric in all extremities    Cardiographics    ECG: Atrial rhythm with frequent PACs    Results for orders placed or performed in visit on 07/13/16   CARDIAC HOLTER MONITOR, 24 HOURS    Narrative    ECG Monitor/24 hours, Complete    Reason for Holter Monitor   JUNCTIONAL RHYTHM    Heartbeat    Slowest 46  Average 61  Fastest  100          Results:   Underlying Rhythm: Normal sinus rhythm      Atrial Arrhythmias: paroxysmal atrial fibrillation            AV Conduction: normal    Ventricular Arrhythmias: premature ventricular contractions;  frequent     ST Segment Analysis:non-specific changes     Symptom Correlation:  none    Comment:   Paroxysmal atrial fibrillation, no significant pauses.      Iona Luna MD                   Cardiology Labs:  Lab Results   Component Value Date/Time    Cholesterol, total 219 08/16/2016 12:00 AM    HDL Cholesterol 42 08/16/2016 12:00 AM    LDL, calculated 139 08/16/2016 12:00 AM    Triglyceride 189 08/16/2016 12:00 AM       Lab Results   Component Value Date/Time    Sodium 142 02/08/2017 01:05 PM    Potassium 3.6 02/08/2017 01:05 PM    Chloride 100 02/08/2017 01:05 PM    CO2 29 02/08/2017 01:05 PM    Glucose 107 02/08/2017 01:05 PM    BUN 21 02/08/2017 01:05 PM    Creatinine 0.95 02/08/2017 01:05 PM    BUN/Creatinine ratio 22 02/08/2017 01:05 PM    GFR est AA 63 02/08/2017 01:05 PM    GFR est non-AA 55 02/08/2017 01:05 PM    Calcium 9.1 02/08/2017 01:05 PM    AST (SGOT) 16 02/08/2017 01:05 PM    Alk. phosphatase 72 02/08/2017 01:05 PM    Protein, total 6.5 02/08/2017 01:05 PM    Albumin 3.9 02/08/2017 01:05 PM    A-G Ratio 1.5 02/08/2017 01:05 PM    ALT (SGPT) 16 02/08/2017 01:05 PM           Assessment:     Assessment:     James Singh was seen today for follow-up. Diagnoses and all orders for this visit:    Systolic CHF, chronic (HCC)  -     AMB POC EKG ROUTINE W/ 12 LEADS, INTER & REP  -     2D ECHO COMPLETE ADULT (TTE) W OR WO CONTR; Future    Essential hypertension  -     AMB POC EKG ROUTINE W/ 12 LEADS, INTER & REP  -     2D ECHO COMPLETE ADULT (TTE) W OR WO CONTR; Future    Paroxysmal atrial fibrillation (HCC)  -     AMB POC EKG ROUTINE W/ 12 LEADS, INTER & REP  -     2D ECHO COMPLETE ADULT (TTE) W OR WO CONTR; Future    Dementia without behavioral disturbance, unspecified dementia type  -     AMB POC EKG ROUTINE W/ 12 LEADS, INTER & REP  -     2D ECHO COMPLETE ADULT (TTE) W OR WO CONTR; Future    Hypothyroidism, unspecified type  -     AMB POC EKG ROUTINE W/ 12 LEADS, INTER & REP  -     2D ECHO COMPLETE ADULT (TTE) W OR WO CONTR; Future        ICD-10-CM ICD-9-CM    1. Systolic CHF, chronic (HCC) I50.22 428.22 AMB POC EKG ROUTINE W/ 12 LEADS, INTER & REP     428.0 2D ECHO COMPLETE ADULT (TTE) W OR WO CONTR   2. Essential hypertension I10 401.9 AMB POC EKG ROUTINE W/ 12 LEADS, INTER & REP      2D ECHO COMPLETE ADULT (TTE) W OR WO CONTR   3. Paroxysmal atrial fibrillation (HCC) I48.0 427.31 AMB POC EKG ROUTINE W/ 12 LEADS, INTER & REP      2D ECHO COMPLETE ADULT (TTE) W OR WO CONTR   4. Dementia without behavioral disturbance, unspecified dementia type F03.90 294.20 AMB POC EKG ROUTINE W/ 12 LEADS, INTER & REP      2D ECHO COMPLETE ADULT (TTE) W OR WO CONTR   5.  Hypothyroidism, unspecified type E03.9 244.9 AMB POC EKG ROUTINE W/ 12 LEADS, INTER & REP      2D ECHO COMPLETE ADULT (TTE) W OR WO CONTR     Orders Placed This Encounter    AMB POC EKG ROUTINE W/ 12 LEADS, INTER & REP Order Specific Question:   Reason for Exam:     Answer:   Routine    2D ECHO COMPLETE ADULT (TTE) W OR WO CONTR     Standing Status:   Future     Standing Expiration Date:   8/24/2017     Order Specific Question:   Reason for Exam:     Answer:   EF 40-45%        Plan:     Patient presents today for f/u after change from Coreg to Metoprolol. Pt has seen ortho and knee pain is improved. Caregiver denies new cardiac complaints. She is having an atrial rhythm with frequent hx of PACs. Hx of PAF on monitor in July, previous discussion poor candidate for anticoag d/t cognitive deficits. I will evaluate with an echo. Continue current care and f/u in 6 months unless EF worsening. Henrene Buerger, NP      [facility]    2/22/2017         Agree with note as outlined by  NP. I confirm findings in history and physical exam. No additional findings noted. Agree with plan as outlined above.      José Miguel Vu MD

## 2017-02-22 NOTE — MR AVS SNAPSHOT
Visit Information Date & Time Provider Department Dept. Phone Encounter #  
 2/22/2017 11:30 AM Daren Joe MD [facility] [phone] [id number] Your Appointments 3/1/2017  1:30 PM  
ECHO CARDIOGRAMS 2D with [address] [facility] Appt Note: Per Dr GARDINER $0CP REM 2D ECHO COMPLETE ADULT (TTE) W OR WO CONTR [58187 CPT(R)]  
 [address]  
[phone] [address]  
  
    
 6/14/2017 10:30 AM  
3 MONTH with [address] [facility] Appt Note: Per Dr Burak Crews $0CP REM  
 [address]  
[phone] [address] Upcoming Health Maintenance Date Due DTaP/Tdap/Td series (1 - Tdap) 2[birth year]1/1953 GLAUCOMA SCREENING Q2Y 2/1/1997 MEDICARE YEARLY EXAM 2/1/1997 Allergies as of 2/22/2017  Review Complete On: 2/22/2017 By: Cameron Boyle, NP Severity Noted Reaction Type Reactions Sulfa (Sulfonamide Antibiotics)  06/21/2016    Not Reported This Time Current Immunizations  Reviewed on 6/6/2016 Name Date Influenza High Dose Vaccine PF 10/3/2016 Pneumococcal Conjugate (PCV-13) 6/6/2016 Pneumococcal Polysaccharide (PPSV-23) 11/3/2014 Zoster Vaccine, Live 12/3/2012 Not reviewed this visit You Were Diagnosed With   
  
 Codes Comments Systolic CHF, chronic (HCC)    -  Primary ICD-10-CM: V08.43 ICD-9-CM: 428.22, 428.0 Essential hypertension     ICD-10-CM: I10 
ICD-9-CM: 401.9 Paroxysmal atrial fibrillation (HCC)     ICD-10-CM: I48.0 ICD-9-CM: 427.31 Dementia without behavioral disturbance, unspecified dementia type     ICD-10-CM: F03.90 ICD-9-CM: 294.20 Hypothyroidism, unspecified type     ICD-10-CM: E03.9 ICD-9-CM: 758. 9 Vitals  BP  
  
  
  
  
 140/80 (BP 1 Location: Right arm, BP Patient Position: Sitting) Vitals History BMI and BSA Data Body Mass Index Body Surface Area 24.53 kg/m 2 1.79 m 2 Preferred Pharmacy Pharmacy Name Phone [facility], [address] [phone] Your Updated Medication List  
  
   
This list is accurate as of: 2/22/17 12:21 PM.  Always use your most recent med list.  
  
  
  
  
 Calcium Carbonate-Vit D3-Min 600 mg (1,500 mg)-400 unit Chew Take  by mouth.  
  
 citalopram 20 mg tablet Commonly known as:  CELEXA  
TAKE 2 TABLETS BY MOUTH DAILY FOR MOOD  
  
 clonazePAM 0.5 mg tablet Commonly known as:  KlonoPIN  
TAKE 1 TABLET BY MOUTH EVERY EVENING ONLY AS NEEDED FOR ANXIETY  
  
 conjugated estrogens 0.625 mg tablet Commonly known as:  PREMARIN Take 0.625 mg by mouth. Twice weekly  
  
 docusate sodium 100 mg Tab Take  by mouth. * fluticasone 50 mcg/actuation nasal spray Commonly known as:  Euna Milad 2 Sprays by Both Nostrils route daily. * fluticasone 50 mcg/actuation nasal spray Commonly known as:  Euna Milad INSTILL 2 SPRAYS IN EACH NOSTIRL ONCE DAILY  
  
 levothyroxine 112 mcg tablet Commonly known as:  SYNTHROID Takes 5 days a week. (Not Monday and Friday)  
  
 lisinopril 2.5 mg tablet Commonly known as:  Michelle Estevez Take 1 Tab by mouth daily. loratadine 10 mg tablet Commonly known as:  Austin Brandi Take 10 mg by mouth.  
  
 memantine 10 mg tablet Commonly known as:  Eliot Hopper Take 1 Tab by mouth daily. metoprolol succinate 25 mg XL tablet Commonly known as:  TOPROL-XL Take 0.5 Tabs by mouth nightly. Replaced Coreg MIRALAX 17 gram packet Generic drug:  polyethylene glycol Take 17 g by mouth. MOBIC 15 mg tablet Generic drug:  meloxicam  
Take 15 mg by mouth daily. raNITIdine 150 mg tablet Commonly known as:  ZANTAC TAKE 1 TABLET BY MOUTH TWICE DAILY FOR STOMACH ACID NEEDS APPOINTMENT * Notice: This list has 2 medication(s) that are the same as other medications prescribed for you. Read the directions carefully, and ask your doctor or other care provider to review them with you. We Performed the Following AMB POC EKG ROUTINE W/ 12 LEADS, INTER & REP [56545 CPT(R)] To-Do List   
 Around 02/25/2017 ECHO:  2D ECHO COMPLETE ADULT (TTE) W OR WO CONTR Please provide this summary of care documentation to your next provider. Your primary care clinician is listed as Nabeel Estrada. If you have any questions after today's visit, please call [phone].

## 2017-03-01 ENCOUNTER — CLINICAL SUPPORT (OUTPATIENT)
Dept: CARDIOLOGY CLINIC | Age: 82
End: 2017-03-01

## 2017-03-01 DIAGNOSIS — E03.9 HYPOTHYROIDISM, UNSPECIFIED TYPE: ICD-10-CM

## 2017-03-01 DIAGNOSIS — F03.90 DEMENTIA WITHOUT BEHAVIORAL DISTURBANCE, UNSPECIFIED DEMENTIA TYPE: ICD-10-CM

## 2017-03-01 DIAGNOSIS — I48.0 PAROXYSMAL ATRIAL FIBRILLATION (HCC): ICD-10-CM

## 2017-03-01 DIAGNOSIS — I50.22 SYSTOLIC CHF, CHRONIC (HCC): ICD-10-CM

## 2017-03-01 DIAGNOSIS — I10 ESSENTIAL HYPERTENSION: ICD-10-CM

## 2017-03-07 ENCOUNTER — OFFICE VISIT (OUTPATIENT)
Dept: FAMILY MEDICINE CLINIC | Age: 82
End: 2017-03-07

## 2017-03-07 VITALS
WEIGHT: 151 LBS | TEMPERATURE: 97.7 F | OXYGEN SATURATION: 93 % | HEART RATE: 56 BPM | SYSTOLIC BLOOD PRESSURE: 171 MMHG | HEIGHT: 66 IN | BODY MASS INDEX: 24.27 KG/M2 | RESPIRATION RATE: 16 BRPM | DIASTOLIC BLOOD PRESSURE: 91 MMHG

## 2017-03-07 DIAGNOSIS — Z00.00 ROUTINE GENERAL MEDICAL EXAMINATION AT A HEALTH CARE FACILITY: Primary | ICD-10-CM

## 2017-03-07 DIAGNOSIS — Z13.39 SCREENING FOR ALCOHOLISM: ICD-10-CM

## 2017-03-07 DIAGNOSIS — Z23 ENCOUNTER FOR IMMUNIZATION: ICD-10-CM

## 2017-03-07 DIAGNOSIS — L60.8 TOENAIL DEFORMITY: ICD-10-CM

## 2017-03-07 NOTE — PROGRESS NOTES
This is a Subsequent Medicare Annual Wellness Visit providing Personalized Prevention Plan Services (PPPS)     I have reviewed the patient's medical history in detail and updated the computerized patient record. History   Nasima Stephenson is a 80 y.o. female who presents for Medicare wellness. Has been having some allergies and wants to clarify which she can take. Currently taking 2 Claritin and the occasional Flonase for these allergies. Evidently Zyrtec made her too tired. She wants to report on how her knees are doing. Saw orthopedist he got a steroid injection in both knees it sounds like. Has been doing physical therapy and has been making great strides. We discussed how she will need to continue doing some kind of physical activity in order to protect those knees. Might need a repeat steroid injection. Currently taking Mobic every day but I cautioned about doing this for the long-term. Recommend backing off to as needed once she feels like therapy has worked its magic    Past Medical History:   Diagnosis Date    Allergic rhinitis     Atrophic vaginitis     Basal cell carcinoma     Constipation     Dementia     lives with caregiver, Sterling Montoya    Depression     Essential hypertension     GERD (gastroesophageal reflux disease)     Hypothyroidism       Past Surgical History:   Procedure Laterality Date    HX CHOLECYSTECTOMY      HX HYSTERECTOMY      HX TONSIL AND ADENOIDECTOMY       Current Outpatient Prescriptions   Medication Sig Dispense Refill    diph,pertuss,acel,,tetanus vac,PF, (ADACEL) 2 Lf-(2.5-5-3-5 mcg)-5Lf/0.5 mL syrg vaccine 0.5 mL by IntraMUSCular route once for 1 dose. 0.5 mL 0    meloxicam (MOBIC) 15 mg tablet Take 15 mg by mouth daily.  fluticasone (FLONASE) 50 mcg/actuation nasal spray 2 Sprays by Both Nostrils route daily.  metoprolol succinate (TOPROL-XL) 25 mg XL tablet Take 0.5 Tabs by mouth nightly.  Replaced Coreg 30 Tab 6    fluticasone (FLONASE) 50 mcg/actuation nasal spray INSTILL 2 SPRAYS IN EACH NOSTIRL ONCE DAILY 1 Bottle 11    clonazePAM (KLONOPIN) 0.5 mg tablet TAKE 1 TABLET BY MOUTH EVERY EVENING ONLY AS NEEDED FOR ANXIETY 90 Tab 0    lisinopril (PRINIVIL, ZESTRIL) 2.5 mg tablet Take 1 Tab by mouth daily. 30 Tab 3    memantine (NAMENDA) 10 mg tablet Take 1 Tab by mouth daily. 90 Tab 3    levothyroxine (SYNTHROID) 112 mcg tablet Takes 5 days a week. (Not Monday and Friday) 75 Tab 3    ranitidine (ZANTAC) 150 mg tablet TAKE 1 TABLET BY MOUTH TWICE DAILY FOR STOMACH ACID NEEDS APPOINTMENT 60 Tab 11    citalopram (CELEXA) 20 mg tablet TAKE 2 TABLETS BY MOUTH DAILY FOR MOOD 180 Tab 3    loratadine (CLARITIN) 10 mg tablet Take 10 mg by mouth.  Calcium Carbonate-Vit D3-Min 600 mg (1,500 mg)-400 unit chew Take  by mouth.  docusate sodium 100 mg tab Take  by mouth.  conjugated estrogens (PREMARIN) 0.625 mg tablet Take 0.625 mg by mouth. Twice weekly      polyethylene glycol (MIRALAX) 17 gram packet Take 17 g by mouth.        Allergies   Allergen Reactions    Sulfa (Sulfonamide Antibiotics) Not Reported This Time     Family History   Problem Relation Age of Onset    Cancer Other      Social History   Substance Use Topics    Smoking status: Never Smoker    Smokeless tobacco: Never Used    Alcohol use No     Patient Active Problem List   Diagnosis Code    Essential hypertension I10    Depression F32.9    Dementia F03.90    GERD (gastroesophageal reflux disease) K21.9    Allergic rhinitis J30.9    Atrophic vaginitis N95.2    Hypothyroidism E03.9    Advanced care planning/counseling discussion Z71.89    Chronic kidney disease, stage III (moderate) M36.7    Systolic CHF, chronic (HCC) I50.22    Paroxysmal atrial fibrillation (HCC) I48.0    Arthritis of both knees M19.90       Depression Risk Factor Screening:     PHQ 2 / 9, over the last two weeks 6/6/2016   Little interest or pleasure in doing things Several days   Feeling down, depressed or hopeless Several days   Total Score PHQ 2 2     Alcohol Risk Factor Screening: On any occasion during the past 3 months, have you had more than 3 drinks containing alcohol? No    Do you average more than 7 drinks per week? No      Functional Ability and Level of Safety:     Hearing Loss   none    Activities of Daily Living   Self-care. Requires assistance with: no ADLs    Fall Risk     Fall Risk Assessment, last 12 mths 3/7/2017   Able to walk? Yes   Fall in past 12 months? No   Fall with injury? -   Number of falls in past 12 months -   Fall Risk Score -     Abuse Screen   Patient is not abused    Review of Systems   ROS:  As listed in HPI. In addition:  Constitutional:   No headache, fever, fatigue, weight loss or weight gain      Eyes:   No redness, pruritis, pain, visual changes, swelling, or discharge      Ears:    No pain, loss or changes in hearing     Cardiac:    No chest pain      Resp:   No cough or shortness of breath      Neuro   No loss of consciousness, dizziness, seizure    Physical Examination     Evaluation of Cognitive Function:  Mood/affect:  happy  Appearance: age appropriate  Family member/caregiver input: Feels like she is doing well    Physical Exam:  Blood pressure (!) 171/91, pulse (!) 56, temperature 97.7 °F (36.5 °C), resp. rate 16, height 5' 6\" (1.676 m), weight 151 lb (68.5 kg), SpO2 93 %. GEN: No apparent distress. Alert and oriented and responds to all questions appropriately. EYES:  Conjunctiva clear; pupils round and reactive to light; extraocular movements are intact. EAR: External ears are normal.  Tympanic membranes are clear and without effusion. NOSE: Turbinates are congested. Some drainage  OROPHYARYNX: No oral lesions or exudates.   NECK:  Supple; no masses; thyroid normal           LUNGS: Respirations unlabored; clear to auscultation bilaterally  CARDIOVASCULAR: Regular, rate, and rhythm without murmurs   ABDOMEN: Soft; nontender; nondistended; normoactive bowel sounds; no masses or organomegaly  NEUROLOGIC:  No focal neurologic deficits. Strength and sensation grossly intact. Coordination and gait grossly intact. EXT: Well perfused. No edema. SKIN: No obvious rashes. Patient Care Team:  Yoly Avelar MD as PCP - General (Internal Medicine)  Lauren Jimenez MD as Physician (Cardiology)    Advice/Referrals/Counseling   Education and counseling provided:    Due for glaucoma screening    Due for tetanus shot, otherwise up-to-date on vaccines    Advance care planning, she is accompanied by her caregiver who is in in law today. Her son is believed to be the power of  but neither of them know if this is written down on paper. They also do not know if she has an advanced directive in place. I recommend that she get this set up before dementia becomes too advanced. Invite her to come back and speak with nurse navigator and make sure this is all set up    Assessment/Plan   Accompanied by her in law Sterling Montoya (I am treating her )    Health maintenance as above  No need for labs today, just had these done    Taking clonazepam nightly for sleep, cautioned against using this every day. Would prefer she only use this as needed    Mobic currently being used every day, when her knee is feeling better with the help of physical therapy she should back this off to as needed    Having some allergies, continue Flonase, can try switching from Claritin to [place]. Dementia is their primary concern, patient has noticed that she is having trouble with short-term memory. She has been taking Namenda for about 3 years now, might consider adding Aricept    She is having trouble reaching down to get to her toes to clip thempodiatry consult    Blood pressure is a little elevated today. This is unusual for her. Did not come down on repeat.   Return in about 2 weeks to recheck, might be a little elevated because she is feeling the effects of allergies today      ICD-10-CM ICD-9-CM    1. Routine general medical examination at a health care facility Z00.00 V70.0    2. Screening for alcoholism Z13.89 V79.1 NE ANNUAL ALCOHOL SCREEN 15 MIN   3. Encounter for immunization Z23 V03.89 diph,pertuss,acel,,tetanus vac,PF, (ADACEL) 2 Lf-(2.5-5-3-5 mcg)-5Lf/0.5 mL syrg vaccine   4.  Toenail deformity L60.8 703.9 REFERRAL TO PODIATRY

## 2017-03-07 NOTE — PATIENT INSTRUCTIONS
Medicare Part B Preventive Services Limitations Recommendation Scheduled   Bone Mass Measurement  (age 72 & older, biennial) Requires diagnosis related to osteoporosis or estrogen deficiency. Biennial benefit unless patient has history of long-term glucocorticoid tx or baseline is needed because initial test was by other method     Cardiovascular Screening Blood Tests (every 5 years)  Total cholesterol, HDL, Triglycerides Order as a panel if possible     Colorectal Cancer Screening  -Fecal occult blood test (annual)  -Flexible sigmoidoscopy (5y)  -Screening colonoscopy (10y)  -Barium Enema      Counseling to Prevent Tobacco Use (up to 8 sessions per year)  - Counseling greater than 3 and up to 10 minutes  - Counseling greater than 10 minutes Patients must be asymptomatic of tobacco-related conditions to receive as preventive service     Diabetes Screening Tests (at least every 3 years, Medicare covers annually or at 6-month intervals for prediabetic patients)    Fasting blood sugar (FBS) or glucose tolerance test (GTT) Patient must be diagnosed with one of the following:  -Hypertension, Dyslipidemia, obesity, previous impaired FBS or GTT  Or any two of the following: overweight, FH of diabetes, age ? 72, history of gestational diabetes, birth of baby weighing more than 9 pounds     Diabetes Self-Management Training (DSMT) (no USPSTF recommendation) Requires referral by treating physician for patient with diabetes or renal disease. 10 hours of initial DSMT session of no less than 30 minutes each in a continuous 12-month period. 2 hours of follow-up DSMT in subsequent years.      Glaucoma Screening (no USPSTF recommendation) Diabetes mellitus, family history, , age 48 or over,  American, age 72 or over     Human Immunodeficiency Virus (HIV) Screening (annually for increased risk patients)  HIV-1 and HIV-2 by EIA, RITA, rapid antibody test, or oral mucosa transudate Patient must be at increased risk for HIV infection per USPSTF guidelines or pregnant. Tests covered annually for patients at increased risk. Pregnant patients may receive up to 3 test during pregnancy. Medical Nutrition Therapy (MNT) (for diabetes or renal disease not recommended schedule) Requires referral by treating physician for patient with diabetes or renal disease. Can be provided in same year as diabetes self-management training (DSMT), and CMS recommends medical nutrition therapy take place after DSMT. Up to 3 hours for initial year and 2 hours in subsequent years. Prostate Cancer Screening (annually up to age 76)  - Digital rectal exam (FATMATA)  - Prostate specific antigen (PSA) Annually (age 48 or over), FATMATA not paid separately when covered E/M service is provided on same date     Seasonal Influenza Vaccination (annually)      Pneumococcal Vaccination (once after 72)      Hepatitis B Vaccinations (if medium/high risk) Medium/high risk factors:  End-stage renal disease,  Hemophiliacs who received Factor VIII or IX concentrates, Clients of institutions for the mentally retarded, Persons who live in the same house as a HepB virus carrier, Homosexual men, Illicit injectable drug abusers. Screening Mammography (biennial age 54-69)? Annually (age 36 or over)     Screening Pap Tests and Pelvic Examination (up to age 79 and after 79 if unknown history or abnormal study last 10 years) Every 25 months except high risk     Ultrasound Screening for Abdominal Aortic Aneurysm (AAA) (once) Patient must be referred through IPPE and not have had a screening for abdominal aortic aneurysm before under Medicare.   Limited to patients who meet one of the following criteria:  - Men who are 73-68 years old and have smoked more than 100 cigarettes in their lifetime.  -Anyone with a FH of AAA  -Anyone recommended for screening by USPSTF     Family Practice Management 2011    Health Maintenance Due   Topic Date Due    DTaP/Tdap/Td  (1 - Tdap) 02/01/1953    Glaucoma Screening   02/01/1997    Annual Well Visit  02/01/1997

## 2017-03-07 NOTE — Clinical Note
PCP referral, has an appointment. The big thing with her is to make sure that the power of  is set up. I think she wants her son to do it but she is not sure if he has medical power of .   She has dementia that will be progressing

## 2017-03-07 NOTE — MR AVS SNAPSHOT
Visit Information Date & Time Provider Department Dept. Phone Encounter #  
 3/7/2017  2:20 PM Travon Canela MD [facility] [phone] [id number] Your Appointments 3/20/2017  2:30 PM  
MEETING with NURSE NAVIGATOR MARIELLA Char Petersonrubyandrea [place] [address] ([address]) Appt Note: ADVANCED CARE PLANNING  
 [address], [facility] [address] [facility] [place] [address]  
[phone]  
  
   
 [address], [place] [address] [address] 6/14/2017 10:30 AM  
3 MONTH with MD [facility] [address]) Appt Note: Per Dr Feliz Sidhu $0CP REM  
 [address]  
[phone] [address] Upcoming Health Maintenance Date Due DTaP/Tdap/Td series (1 - Tdap) 2/1/1953 GLAUCOMA SCREENING Q2Y 2/1/1997 MEDICARE YEARLY EXAM 2/1/1997 Allergies as of 3/7/2017  Review Complete On: 3/7/2017 By: Travon Canela MD  
  
 Severity Noted Reaction Type Reactions Sulfa (Sulfonamide Antibiotics)  06/21/2016    Not Reported This Time Current Immunizations  Reviewed on 6/6/2016 Name Date Influenza High Dose Vaccine PF 10/3/2016 Pneumococcal Conjugate (PCV-13) 6/6/2016 Pneumococcal Polysaccharide (PPSV-23) 11/3/2014 Zoster Vaccine, Live 12/3/2012 Not reviewed this visit You Were Diagnosed With   
  
 Codes Comments Routine general medical examination at a health care facility    -  Primary ICD-10-CM: Z00.00 ICD-9-CM: V70.0 Screening for alcoholism     ICD-10-CM: Z13.89 ICD-9-CM: V79.1 Encounter for immunization     ICD-10-CM: M10 ICD-9-CM: V03.89 Toenail deformity     ICD-10-CM: L60.8 ICD-9-CM: 703.9 Vitals BP Pulse Temp Resp Height(growth percentile) Weight(growth percentile)  (!) 173/96 (BP 1 Location: Left arm, BP Patient Position: Sitting) (!) 56 97.7 °F (36.5 °C) 16 5' 6\" (1.676 m) 151 lb (68.5 kg) SpO2 BMI OB Status Smoking Status 93% 24.37 kg/m2 Hysterectomy Never Smoker BMI and BSA Data Body Mass Index Body Surface Area  
 24.37 kg/m 2 1.79 m 2 Preferred Pharmacy Pharmacy Name Phone [facility], [address] [phone] Your Updated Medication List  
  
   
This list is accurate as of: 3/7/17  3:36 PM.  Always use your most recent med list.  
  
  
  
  
 Calcium Carbonate-Vit D3-Min 600 mg (1,500 mg)-400 unit Chew Take  by mouth.  
  
 citalopram 20 mg tablet Commonly known as:  CELEXA  
TAKE 2 TABLETS BY MOUTH DAILY FOR MOOD  
  
 clonazePAM 0.5 mg tablet Commonly known as:  KlonoPIN  
TAKE 1 TABLET BY MOUTH EVERY EVENING ONLY AS NEEDED FOR ANXIETY  
  
 conjugated estrogens 0.625 mg tablet Commonly known as:  PREMARIN Take 0.625 mg by mouth. Twice weekly  
  
 diph,pertuss(acel),tetanus vac(PF) 2 Lf-(2.5-5-3-5 mcg)-5Lf/0.5 mL Syrg vaccine Commonly known as:  ADACEL  
0.5 mL by IntraMUSCular route once for 1 dose. docusate sodium 100 mg Tab Take  by mouth. * fluticasone 50 mcg/actuation nasal spray Commonly known as:  Chase Merles 2 Sprays by Both Nostrils route daily. * fluticasone 50 mcg/actuation nasal spray Commonly known as:  Chase Merles INSTILL 2 SPRAYS IN EACH NOSTIRL ONCE DAILY  
  
 levothyroxine 112 mcg tablet Commonly known as:  SYNTHROID Takes 5 days a week. (Not Monday and Friday)  
  
 lisinopril 2.5 mg tablet Commonly known as:  Inna Shames Take 1 Tab by mouth daily. loratadine 10 mg tablet Commonly known as:  Clarence Seal Take 10 mg by mouth.  
  
 memantine 10 mg tablet Commonly known as:  Dariela Sheller Take 1 Tab by mouth daily. metoprolol succinate 25 mg XL tablet Commonly known as:  TOPROL-XL Take 0.5 Tabs by mouth nightly. Replaced Coreg MIRALAX 17 gram packet Generic drug:  polyethylene glycol Take 17 g by mouth. MOBIC 15 mg tablet Generic drug:  meloxicam  
Take 15 mg by mouth daily. raNITIdine 150 mg tablet Commonly known as:  ZANTAC TAKE 1 TABLET BY MOUTH TWICE DAILY FOR STOMACH ACID NEEDS APPOINTMENT * Notice: This list has 2 medication(s) that are the same as other medications prescribed for you. Read the directions carefully, and ask your doctor or other care provider to review them with you. Prescriptions Printed Refills diph,pertuss,acel,,tetanus vac,PF, (ADACEL) 2 Lf-(2.5-5-3-5 mcg)-5Lf/0.5 mL syrg vaccine 0 Si.5 mL by IntraMUSCular route once for 1 dose. Class: Print Route: IntraMUSCular We Performed the Following LA ANNUAL ALCOHOL SCREEN 15 MIN [id number] [facility]] REFERRAL TO PODIATRY [REF90 Custom] Referral Information Referral ID Referred By Referred To  
  
 [id number] [place], [address] [facility] Kaitlynn Bernal [address] [place] [address] [place], [address] Visits Status Start Date End Date 1 New Request 3/7/17 3/7/18 If your referral has a status of pending review or denied, additional information will be sent to support the outcome of this decision. Patient Instructions Medicare Part B Preventive Services Limitations Recommendation Scheduled Bone Mass Measurement 
(age 72 & older, biennial) Requires diagnosis related to osteoporosis or estrogen deficiency. Biennial benefit unless patient has history of long-term glucocorticoid tx or baseline is needed because initial test was by other method Cardiovascular Screening Blood Tests (every 5 years) Total cholesterol, HDL, Triglycerides Order as a panel if possible Colorectal Cancer Screening 
-Fecal occult blood test (annual) -Flexible sigmoidoscopy (5y) 
-Screening colonoscopy (10y) -Barium Enema Counseling to Prevent Tobacco Use (up to 8 sessions per year) - Counseling greater than 3 and up to 10 minutes - Counseling greater than 10 minutes Patients must be asymptomatic of tobacco-related conditions to receive as preventive service Diabetes Screening Tests (at least every 3 years, Medicare covers annually or at 6-month intervals for prediabetic patients) Fasting blood sugar (FBS) or glucose tolerance test (GTT) Patient must be diagnosed with one of the following: 
-Hypertension, Dyslipidemia, obesity, previous impaired FBS or GTT 
Or any two of the following: overweight, FH of diabetes, age ? 72, history of gestational diabetes, birth of baby weighing more than 9 pounds Diabetes Self-Management Training (DSMT) (no USPSTF recommendation) Requires referral by treating physician for patient with diabetes or renal disease. 10 hours of initial DSMT session of no less than 30 minutes each in a continuous 12-month period. 2 hours of follow-up DSMT in subsequent years. Glaucoma Screening (no USPSTF recommendation) Diabetes mellitus, family history, , age 48 or over,  American, age 72 or over Human Immunodeficiency Virus (HIV) Screening (annually for increased risk patients) HIV-1 and HIV-2 by EIA, RITA, rapid antibody test, or oral mucosa transudate Patient must be at increased risk for HIV infection per USPSTF guidelines or pregnant. Tests covered annually for patients at increased risk. Pregnant patients may receive up to 3 test during pregnancy. Medical Nutrition Therapy (MNT) (for diabetes or renal disease not recommended schedule) Requires referral by treating physician for patient with diabetes or renal disease. Can be provided in same year as diabetes self-management training (DSMT), and CMS recommends medical nutrition therapy take place after DSMT. Up to 3 hours for initial year and 2 hours in subsequent years. Prostate Cancer Screening (annually up to age 76) - Digital rectal exam (FATMATA) - Prostate specific antigen (PSA) Annually (age 48 or over), FATMATA not paid separately when covered E/M service is provided on same date Seasonal Influenza Vaccination (annually) Pneumococcal Vaccination (once after 65) Hepatitis B Vaccinations (if medium/high risk) Medium/high risk factors:  End-stage renal disease, Hemophiliacs who received Factor VIII or IX concentrates, Clients of institutions for the mentally retarded, Persons who live in the same house as a HepB virus carrier, Homosexual men, Illicit injectable drug abusers. Screening Mammography (biennial age 54-69)? Annually (age 36 or over) Screening Pap Tests and Pelvic Examination (up to age 79 and after 79 if unknown history or abnormal study last 10 years) Every 24 months except high risk Ultrasound Screening for Abdominal Aortic Aneurysm (AAA) (once) Patient must be referred through IPPE and not have had a screening for abdominal aortic aneurysm before under Medicare. Limited to patients who meet one of the following criteria: 
- Men who are 73-68 years old and have smoked more than 100 cigarettes in their lifetime. 
-Anyone with a FH of AAA 
-Anyone recommended for screening by USPSTF Family Practice Management 2011 Health Maintenance Due Topic Date Due  
 DTaP/Tdap/Td  (1 - Tdap) 02/01/1953  Glaucoma Screening   02/01/1997 Gonzalo Lloyd Annual Well Visit  02/01/1997 Please provide this summary of care documentation to your next provider. Your primary care clinician is listed as Yoly Avelar. If you have any questions after today's visit, please call [phone].

## 2017-03-07 NOTE — ACP (ADVANCE CARE PLANNING)
Advance Care Planning (ACP) Provider Conversation Snapshot    Date of ACP Conversation: 03/07/17   She is accompanied by her caregiver who is an in-law today. Her son is believed to be the power of  but neither of them know if this is written down on paper. They also do not know if she has an advanced directive in place. I recommend that she get this set up before dementia becomes too advanced.   Invite her to come back and speak with nurse navigator and make sure this is all set up

## 2017-03-07 NOTE — PROGRESS NOTES
.  Chief Complaint   Patient presents with    Annual Wellness Visit    Medication Evaluation     allergy meds    Referral Request     nail clipping     . Iris Cornelius

## 2017-03-13 ENCOUNTER — TELEPHONE (OUTPATIENT)
Dept: CARDIOLOGY CLINIC | Age: 82
End: 2017-03-13

## 2017-03-13 NOTE — TELEPHONE ENCOUNTER
----- Message from Jeffrey Coronado MD sent at 3/11/2017  5:04 PM EST -----  Echo similar to last year, no significant change. Continue same.  thx.

## 2017-03-14 ENCOUNTER — TELEPHONE (OUTPATIENT)
Dept: FAMILY MEDICINE CLINIC | Age: 82
End: 2017-03-14

## 2017-03-16 NOTE — TELEPHONE ENCOUNTER
Spoke with caregiver and she is going to discuss with son. Son to give facilitator call and discuss ACP for patient.

## 2017-03-20 ENCOUNTER — OFFICE VISIT (OUTPATIENT)
Dept: FAMILY MEDICINE CLINIC | Age: 82
End: 2017-03-20

## 2017-03-20 DIAGNOSIS — Z71.89 ACP (ADVANCE CARE PLANNING): Primary | ICD-10-CM

## 2017-03-24 ENCOUNTER — OFFICE VISIT (OUTPATIENT)
Dept: FAMILY MEDICINE CLINIC | Age: 82
End: 2017-03-24

## 2017-03-24 VITALS
HEIGHT: 66 IN | DIASTOLIC BLOOD PRESSURE: 68 MMHG | HEART RATE: 61 BPM | OXYGEN SATURATION: 94 % | BODY MASS INDEX: 24.36 KG/M2 | TEMPERATURE: 97.8 F | SYSTOLIC BLOOD PRESSURE: 145 MMHG | WEIGHT: 151.6 LBS | RESPIRATION RATE: 16 BRPM

## 2017-03-24 DIAGNOSIS — M62.838 TRAPEZIUS MUSCLE SPASM: ICD-10-CM

## 2017-03-24 DIAGNOSIS — J30.89 SEASONAL ALLERGIC RHINITIS DUE TO OTHER ALLERGIC TRIGGER: ICD-10-CM

## 2017-03-24 DIAGNOSIS — M75.102 ROTATOR CUFF SYNDROME, LEFT: Primary | ICD-10-CM

## 2017-03-24 DIAGNOSIS — I10 ESSENTIAL HYPERTENSION: ICD-10-CM

## 2017-03-24 RX ORDER — MINERAL OIL
ENEMA (ML) RECTAL
COMMUNITY

## 2017-03-24 NOTE — PROGRESS NOTES
.  Chief Complaint   Patient presents with    Hypertension    Knee Pain     both     . Pantera Agrawal

## 2017-03-24 NOTE — PROGRESS NOTES
HPI  Skyla Booker is a 80 y.o. female who presents to follow-up on blood pressure. Was elevated at the last visit and I asked her to return to make sure that it came down. It appears to have done so. Has a new complaint today, has been having headaches for the last week or so, bifrontal cheating to the left side. Associated with left-sided neck pain. She also has some left-sided shoulder pain that started a few days before these symptoms. Does not clear if there was any injury but she is bathing herself and Mrs. Gunnar Magana who is with her has noticed that she has been struggling to pull herself up out of the tub using that left arm. Has not tried anything for the headache, shoulder    Is getting good benefit from physical therapy on her knees. Range of motion has increased and her walking is better. Has backed Mobic off to only as neededon the days that she goes to physical therapy. PMHx:  Past Medical History:   Diagnosis Date    Allergic rhinitis     Atrophic vaginitis     Basal cell carcinoma     Constipation     Dementia     lives with caregiver, Angela Rawls    Depression     Essential hypertension     GERD (gastroesophageal reflux disease)     Hypothyroidism        Meds:   Current Outpatient Prescriptions   Medication Sig Dispense Refill    fexofenadine (ALLEGRA) 180 mg tablet Take  by mouth.  meloxicam (MOBIC) 15 mg tablet Take 15 mg by mouth daily.  fluticasone (FLONASE) 50 mcg/actuation nasal spray 2 Sprays by Both Nostrils route daily.  metoprolol succinate (TOPROL-XL) 25 mg XL tablet Take 0.5 Tabs by mouth nightly. Replaced Coreg 30 Tab 6    fluticasone (FLONASE) 50 mcg/actuation nasal spray INSTILL 2 SPRAYS IN EACH NOSTIRL ONCE DAILY 1 Bottle 11    clonazePAM (KLONOPIN) 0.5 mg tablet TAKE 1 TABLET BY MOUTH EVERY EVENING ONLY AS NEEDED FOR ANXIETY 90 Tab 0    lisinopril (PRINIVIL, ZESTRIL) 2.5 mg tablet Take 1 Tab by mouth daily.  30 Tab 3    memantine (NAMENDA) 10 mg tablet Take 1 Tab by mouth daily. 90 Tab 3    levothyroxine (SYNTHROID) 112 mcg tablet Takes 5 days a week. (Not Monday and Friday) 75 Tab 3    ranitidine (ZANTAC) 150 mg tablet TAKE 1 TABLET BY MOUTH TWICE DAILY FOR STOMACH ACID NEEDS APPOINTMENT 60 Tab 11    citalopram (CELEXA) 20 mg tablet TAKE 2 TABLETS BY MOUTH DAILY FOR MOOD 180 Tab 3    Calcium Carbonate-Vit D3-Min 600 mg (1,500 mg)-400 unit chew Take  by mouth.  docusate sodium 100 mg tab Take  by mouth.  conjugated estrogens (PREMARIN) 0.625 mg tablet Take 0.625 mg by mouth. Twice weekly      polyethylene glycol (MIRALAX) 17 gram packet Take 17 g by mouth.  loratadine (CLARITIN) 10 mg tablet Take 10 mg by mouth. Allergies: Allergies   Allergen Reactions    Sulfa (Sulfonamide Antibiotics) Not Reported This Time       Smoker:  History   Smoking Status    Never Smoker   Smokeless Tobacco    Never Used       ETOH:   History   Alcohol Use No       FH:   Family History   Problem Relation Age of Onset    Cancer Other        ROS:  As listed in HPI. In addition:  Constitutional:   No headache, fever, fatigue, weight loss or weight gain      Cardiac:    No chest pain      Resp:   No cough or shortness of breath      Neuro   No loss of consciousness, dizziness, seizures      Physical Exam:  Blood pressure 145/68, pulse 61, temperature 97.8 °F (36.6 °C), resp. rate 16, height 5' 6\" (1.676 m), weight 151 lb 9.6 oz (68.8 kg), SpO2 94 %. GEN: No apparent distress. Alert and oriented and responds to all questions appropriately. NOSE: Turbinates are congested   OROPHYARYNX: No oral lesions or exudates. NECK: Left-sided scalene and trapezius muscle spasm. The trapezius in particular has a knot in the superior trapezius muscle it is exquisitely tender to palpation   Shoulder:, Left shoulder examined. Rotator cuff is painful in the supraspinatus and infraspinatus muscles but strength is intact.   Impingement not evaluated  NEUROLOGIC:  No focal neurologic deficits. Strength and sensation grossly intact. Coordination and gait grossly intact. Assessment and Plan     Hypertension, well controlled  No change medications    Rotator cuff syndrome leading to trapezius muscle spasm and subsequent tension type headache. Provided education and exercises for the rotator cuff  Trapezius would be amenable to stretches, warm compress, massage. Mobic would help but only use daily for about 2 weeks  Wrote a physical therapy order that they can ask about. Might not be able to do shoulder and knee at the same time. Seasonal allergic rhinitis  Started Allegra, still congested. Doing much better though. Mrs. Flako Lynn noted that she gets a prescription of Allegra 60 mg that is covered by insurance and is a bit cheaper. The pill is smaller. Patient is benefiting from the higher over-the-counter dose but may benefit from prescription sometime in the future      ICD-10-CM ICD-9-CM    1. Rotator cuff syndrome, left M75.102 726.10 fexofenadine (ALLEGRA) 180 mg tablet      REFERRAL TO PHYSICAL THERAPY   2. Trapezius muscle spasm M62.838 728.85 fexofenadine (ALLEGRA) 180 mg tablet      REFERRAL TO PHYSICAL THERAPY   3. Essential hypertension I10 401.9    4. Seasonal allergic rhinitis due to other allergic trigger J30.89         AVS given.  Pt expressed understanding of instructions

## 2017-03-24 NOTE — PATIENT INSTRUCTIONS
Rotator Cuff: Exercises  Your Care Instructions  Here are some examples of typical rehabilitation exercises for your condition. Start each exercise slowly. Ease off the exercise if you start to have pain. Your doctor or physical therapist will tell you when you can start these exercises and which ones will work best for you. How to do the exercises  Pendulum swing    Note: If you have pain in your back, do not do this exercise. 1. Hold on to a table or the back of a chair with your good arm. Then bend forward a little and let your sore arm hang straight down. This exercise does not use the arm muscles. Rather, use your legs and your hips to create movement that makes your arm swing freely. 2. Use the movement from your hips and legs to guide the slightly swinging arm back and forth like a pendulum (or elephant trunk). Then guide it in circles that start small (about the size of a dinner plate). Make the circles a bit larger each day, as your pain allows. 3. Do this exercise for 5 minutes, 5 to 7 times each day. 4. As you have less pain, try bending over a little farther to do this exercise. This will increase the amount of movement at your shoulder. Posterior stretching exercise    1. Hold the elbow of your injured arm with your other hand. 2. Use your hand to pull your injured arm gently up and across your body. You will feel a gentle stretch across the back of your injured shoulder. 3. Hold for at least 15 to 30 seconds. Then slowly lower your arm. 4. Repeat 2 to 4 times. Up-the-back stretch    Note: Your doctor or physical therapist may want you to wait to do this stretch until you have regained most of your range of motion and strength. You can do this stretch in different ways. Hold any of these stretches for at least 15 to 30 seconds. Repeat them 2 to 4 times. 1. Put your hand in your back pocket. Let it rest there to stretch your shoulder.   2. With your other hand, hold your injured arm (palm outward) behind your back by the wrist. Pull your arm up gently to stretch your shoulder. 3. Next, put a towel over your other shoulder. Put the hand of your injured arm behind your back. Now hold the back end of the towel. With the other hand, hold the front end of the towel in front of your body. Pull gently on the front end of the towel. This will bring your hand farther up your back to stretch your shoulder. Overhead stretch    1. Standing about an arm's length away, grasp onto a solid surface. You could use a countertop, a doorknob, or the back of a sturdy chair. 2. With your knees slightly bent, bend forward with your arms straight. Lower your upper body, and let your shoulders stretch. 3. As your shoulders are able to stretch farther, you may need to take a step or two backward. 4. Hold for at least 15 to 30 seconds. Then stand up and relax. If you had stepped back during your stretch, step forward so you can keep your hands on the solid surface. 5. Repeat 2 to 4 times. Shoulder flexion (lying down)    Note: To make a wand for this exercise, use a piece of PVC pipe or a broom handle with the broom removed. Make the wand about a foot wider than your shoulders. 1. Lie on your back, holding a wand with both hands. Your palms should face down as you hold the wand. 2. Keeping your elbows straight, slowly raise your arms over your head. Raise them until you feel a stretch in your shoulders, upper back, and chest.  3. Hold for 15 to 30 seconds. 4. Repeat 2 to 4 times. Shoulder rotation (lying down)    Note: To make a wand for this exercise, use a piece of PVC pipe or a broom handle with the broom removed. Make the wand about a foot wider than your shoulders. 1. Lie on your back. Hold a wand with both hands with your elbows bent and palms up. 2. Keep your elbows close to your body, and move the wand across your body toward the sore arm. 3. Hold for 8 to 12 seconds. 4. Repeat 2 to 4 times.   Rachelle Lockett climbing (to the side)    Note: Avoid any movement that is straight to your side, and be careful not to arch your back. Your arm should stay about 30 degrees to the front of your side. 1. Stand with your side to a wall so that your fingers can just touch it at an angle about 30 degrees toward the front of your body. 2. Walk the fingers of your injured arm up the wall as high as pain permits. Try not to shrug your shoulder up toward your ear as you move your arm up. 3. Hold that position for a count of at least 15 to 20.  4. Walk your fingers back down to the starting position. 5. Repeat at least 2 to 4 times. Try to reach higher each time. Wall climbing (to the front)    Note: During this stretching exercise, be careful not to arch your back. 1. Face a wall, and stand so your fingers can just touch it. 2. Keeping your shoulder down, walk the fingers of your injured arm up the wall as high as pain permits. (Don't shrug your shoulder up toward your ear.)  3. Hold your arm in that position for at least 15 to 30 seconds. 4. Slowly walk your fingers back down to where you started. 5. Repeat at least 2 to 4 times. Try to reach higher each time. Shoulder blade squeeze    1. Stand with your arms at your sides, and squeeze your shoulder blades together. Do not raise your shoulders up as you squeeze. 2. Hold 6 seconds. 3. Repeat 8 to 12 times. Scapular exercise: Arm reach    1. Lie flat on your back. This exercise is a very slight motion that starts with your arms raised (elbows straight, arms straight). 2. From this position, reach higher toward the good or ceiling. Keep your elbows straight. All motion should be from your shoulder blade only. 3. Relax your arms back to where you started. 4. Repeat 8 to 12 times. Arm raise to the side    Note: During this strengthening exercise, your arm should stay about 30 degrees to the front of your side.   1. Slowly raise your injured arm to the side, with your thumb facing up. Raise your arm 60 degrees at the most (shoulder level is 90 degrees). 2. Hold the position for 3 to 5 seconds. Then lower your arm back to your side. If you need to, bring your \"good\" arm across your body and place it under the elbow as you lower your injured arm. Use your good arm to keep your injured arm from dropping down too fast.  3. Repeat 8 to 12 times. 4. When you first start out, don't hold any extra weight in your hand. As you get stronger, you may use a 1-pound to 2-pound dumbbell or a small can of food. Shoulder flexor and extensor exercise    Note: These are isometric exercises. That means you contract your muscles without actually moving. · Push forward (flex): Stand facing a wall or doorjamb, about 6 inches or less back. Hold your injured arm against your body. Make a closed fist with your thumb on top. Then gently push your hand forward into the wall with about 25% to 50% of your strength. Don't let your body move backward as you push. Hold for about 6 seconds. Relax for a few seconds. Repeat 8 to 12 times. · Push backward (extend): Stand with your back flat against a wall. Your upper arm should be against the wall, with your elbow bent 90 degrees (your hand straight ahead). Push your elbow gently back against the wall with about 25% to 50% of your strength. Don't let your body move forward as you push. Hold for about 6 seconds. Relax for a few seconds. Repeat 8 to 12 times. Scapular exercise: Wall push-ups    Note: This exercise is best done with your fingers somewhat turned out, rather than straight up and down. 1. Stand facing a wall, about 12 inches to 18 inches away. 2. Place your hands on the wall at shoulder height. 3. Slowly bend your elbows and bring your face to the wall. Keep your back and hips straight. 4. Push back to where you started. 5. Repeat 8 to 12 times. 6. When you can do this exercise against a wall comfortably, you can try it against a counter.  You can then slowly progress to the end of a couch, then to a sturdy chair, and finally to the floor. Scapular exercise: Retraction    Note: For this exercise, you will need elastic exercise material, such as surgical tubing or Thera-Band. 1. Put the band around a solid object at about waist level. (A bedpost will work well.) Each hand should hold an end of the band. 2. With your elbows at your sides and bent to 90 degrees, pull the band back. Your shoulder blades should move toward each other. Then move your arms back where you started. 3. Repeat 8 to 12 times. 4. If you have good range of motion in your shoulders, try this exercise with your arms lifted out to the sides. Keep your elbows at a 90-degree angle. Raise the elastic band up to about shoulder level. Pull the band back to move your shoulder blades toward each other. Then move your arms back where you started. Internal rotator strengthening exercise    1. Start by tying a piece of elastic exercise material to a doorknob. You can use surgical tubing or Thera-Band. 2. Stand or sit with your shoulder relaxed and your elbow bent 90 degrees. Your upper arm should rest comfortably against your side. Squeeze a rolled towel between your elbow and your body for comfort. This will help keep your arm at your side. 3. Hold one end of the elastic band in the hand of the painful arm. 4. Slowly rotate your forearm toward your body until it touches your belly. Slowly move it back to where you started. 5. Keep your elbow and upper arm firmly tucked against the towel roll or at your side. 6. Repeat 8 to 12 times. External rotator strengthening exercise    1. Start by tying a piece of elastic exercise material to a doorknob. You can use surgical tubing or Thera-Band. (You may also hold one end of the band in each hand.)  2. Stand or sit with your shoulder relaxed and your elbow bent 90 degrees. Your upper arm should rest comfortably against your side.  Squeeze a rolled towel between your elbow and your body for comfort. This will help keep your arm at your side. 3. Hold one end of the elastic band with the hand of the painful arm. 4. Start with your forearm across your belly. Slowly rotate the forearm out away from your body. Keep your elbow and upper arm tucked against the towel roll or the side of your body until you begin to feel tightness in your shoulder. Slowly move your arm back to where you started. 5. Repeat 8 to 12 times. Follow-up care is a key part of your treatment and safety. Be sure to make and go to all appointments, and call your doctor if you are having problems. It's also a good idea to know your test results and keep a list of the medicines you take. Where can you learn more? Go to http://rosy-sancho.info/. Enter Johann Yuridia in the search box to learn more about \"Rotator Cuff: Exercises. \"  Current as of: May 23, 2016  Content Version: 11.1  © 9845-4594 [employer]. Care instructions adapted under license by [employer] (which disclaims liability or warranty for this information). If you have questions about a medical condition or this instruction, always ask your healthcare professional. [address] any warranty or liability for your use of this information.

## 2017-03-24 NOTE — MR AVS SNAPSHOT
Visit Information Date & Time Provider Department Dept. Phone Encounter #  
 3/24/2017 10:40 AM Peggy Ghotra MD [address] [facility] [phone] [id number] Your Appointments 6/14/2017 10:30 AM  
3 MONTH with Michelle Rivera MD  
[facility]) Appt Note: Per Dr Shanta Hernandez $0CP REM  
 [address] [facility]  
[phone] [address] [facility] Upcoming Health Maintenance Date Due DTaP/Tdap/Td series (1 - Tdap) 2/1/1953 GLAUCOMA SCREENING Q2Y 2/1/1997 MEDICARE YEARLY EXAM 3/8/2018 Allergies as of 3/24/2017  Review Complete On: 3/7/2017 By: Peggy Ghotra MD  
  
 Severity Noted Reaction Type Reactions Sulfa (Sulfonamide Antibiotics)  06/21/2016    Not Reported This Time Current Immunizations  Reviewed on 6/6/2016 Name Date Influenza High Dose Vaccine PF 10/3/2016 Pneumococcal Conjugate (PCV-13) 6/6/2016 Pneumococcal Polysaccharide (PPSV-23) 11/3/2014 Zoster Vaccine, Live 12/3/2012 Not reviewed this visit You Were Diagnosed With   
  
 Codes Comments Rotator cuff syndrome, left    -  Primary ICD-10-CM: M75.102 ICD-9-CM: 726.10 Trapezius muscle spasm     ICD-10-CM: D59.216 ICD-9-CM: 728.85 Essential hypertension     ICD-10-CM: I10 
ICD-9-CM: 401.9 Vitals BP Pulse Temp Resp Height(growth percentile) Weight(growth percentile) 145/68 (BP 1 Location: Left arm, BP Patient Position: Sitting) 61 97.8 °F (36.6 °C) 16 5' 6\" (1.676 m) 151 lb 9.6 oz (68.8 kg) SpO2 BMI OB Status Smoking Status 94% 24.47 kg/m2 Hysterectomy Never Smoker Vitals History BMI and BSA Data Body Mass Index Body Surface Area  
 24.47 kg/m 2 1.79 m 2 Preferred Pharmacy Pharmacy Name Phone [facility] [phone] Your Updated Medication List  
  
   
This list is accurate as of: 3/24/17 12:01 PM.  Always use your most recent med list.  
  
  
  
  
 Calcium Carbonate-Vit D3-Min 600 mg (1,500 mg)-400 unit Chew Take  by mouth.  
  
 citalopram 20 mg tablet Commonly known as:  CELEXA  
TAKE 2 TABLETS BY MOUTH DAILY FOR MOOD  
  
 clonazePAM 0.5 mg tablet Commonly known as:  KlonoPIN  
TAKE 1 TABLET BY MOUTH EVERY EVENING ONLY AS NEEDED FOR ANXIETY  
  
 conjugated estrogens 0.625 mg tablet Commonly known as:  PREMARIN Take 0.625 mg by mouth. Twice weekly  
  
 docusate sodium 100 mg Tab Take  by mouth. fexofenadine 180 mg tablet Commonly known as:  Ashley Men Take  by mouth. * fluticasone 50 mcg/actuation nasal spray Commonly known as:  [place] 2 Sprays by Both Nostrils route daily. * fluticasone 50 mcg/actuation nasal spray Commonly known as:  [place] INSTILL 2 SPRAYS IN EACH NOSTIRL ONCE DAILY  
  
 levothyroxine 112 mcg tablet Commonly known as:  SYNTHROID Takes 5 days a week. (Not Monday and Friday)  
  
 lisinopril 2.5 mg tablet Commonly known as:  Merilynn Arun Take 1 Tab by mouth daily. loratadine 10 mg tablet Commonly known as:  Vickki Aracely Take 10 mg by mouth.  
  
 memantine 10 mg tablet Commonly known as:  Earla Phy Take 1 Tab by mouth daily. metoprolol succinate 25 mg XL tablet Commonly known as:  TOPROL-XL Take 0.5 Tabs by mouth nightly. Replaced Coreg MIRALAX 17 gram packet Generic drug:  polyethylene glycol Take 17 g by mouth. MOBIC 15 mg tablet Generic drug:  meloxicam  
Take 15 mg by mouth daily. raNITIdine 150 mg tablet Commonly known as:  ZANTAC TAKE 1 TABLET BY MOUTH TWICE DAILY FOR STOMACH ACID NEEDS APPOINTMENT * Notice: This list has 2 medication(s) that are the same as other medications prescribed for you.  Read the directions carefully, and ask your doctor or other care provider to review them with you. We Performed the Following REFERRAL TO PHYSICAL THERAPY [UDH33 Custom] Referral Information Referral ID Referred By Referred To  
  
 [id number] Alida DUENAS Not Available Visits Status Start Date End Date 1 New Request 3/24/17 3/24/18 If your referral has a status of pending review or denied, additional information will be sent to support the outcome of this decision. Patient Instructions Rotator Cuff: Exercises Your Care Instructions Here are some examples of typical rehabilitation exercises for your condition. Start each exercise slowly. Ease off the exercise if you start to have pain. Your doctor or physical therapist will tell you when you can start these exercises and which ones will work best for you. How to do the exercises Pendulum swing Note: If you have pain in your back, do not do this exercise. 1. Hold on to a table or the back of a chair with your good arm. Then bend forward a little and let your sore arm hang straight down. This exercise does not use the arm muscles. Rather, use your legs and your hips to create movement that makes your arm swing freely. 2. Use the movement from your hips and legs to guide the slightly swinging arm back and forth like a pendulum (or elephant trunk). Then guide it in circles that start small (about the size of a dinner plate). Make the circles a bit larger each day, as your pain allows. 3. Do this exercise for 5 minutes, 5 to 7 times each day. 4. As you have less pain, try bending over a little farther to do this exercise. This will increase the amount of movement at your shoulder. Posterior stretching exercise 1. Hold the elbow of your injured arm with your other hand. 2. Use your hand to pull your injured arm gently up and across your body. You will feel a gentle stretch across the back of your injured shoulder. 3. Hold for at least 15 to 30 seconds. Then slowly lower your arm. 4. Repeat 2 to 4 times. Up-the-back stretch Note: Your doctor or physical therapist may want you to wait to do this stretch until you have regained most of your range of motion and strength. You can do this stretch in different ways. Hold any of these stretches for at least 15 to 30 seconds. Repeat them 2 to 4 times. 1. Put your hand in your back pocket. Let it rest there to stretch your shoulder. 2. With your other hand, hold your injured arm (palm outward) behind your back by the wrist. Pull your arm up gently to stretch your shoulder. 3. Next, put a towel over your other shoulder. Put the hand of your injured arm behind your back. Now hold the back end of the towel. With the other hand, hold the front end of the towel in front of your body. Pull gently on the front end of the towel. This will bring your hand farther up your back to stretch your shoulder. Overhead stretch 1. Standing about an arm's length away, grasp onto a solid surface. You could use a countertop, a doorknob, or the back of a sturdy chair. 2. With your knees slightly bent, bend forward with your arms straight. Lower your upper body, and let your shoulders stretch. 3. As your shoulders are able to stretch farther, you may need to take a step or two backward. 4. Hold for at least 15 to 30 seconds. Then stand up and relax. If you had stepped back during your stretch, step forward so you can keep your hands on the solid surface. 5. Repeat 2 to 4 times. Shoulder flexion (lying down) Note: To make a wand for this exercise, use a piece of PVC pipe or a broom handle with the broom removed. Make the wand about a foot wider than your shoulders. 1. Lie on your back, holding a wand with both hands. Your palms should face down as you hold the wand. 2. Keeping your elbows straight, slowly raise your arms over your head. Raise them until you feel a stretch in your shoulders, upper back, and chest. 
3. Hold for 15 to 30 seconds. 4. Repeat 2 to 4 times. Shoulder rotation (lying down) Note: To make a wand for this exercise, use a piece of PVC pipe or a broom handle with the broom removed. Make the wand about a foot wider than your shoulders. 1. Lie on your back. Hold a wand with both hands with your elbows bent and palms up. 2. Keep your elbows close to your body, and move the wand across your body toward the sore arm. 3. Hold for 8 to 12 seconds. 4. Repeat 2 to 4 times. Wall climbing (to the side) Note: Avoid any movement that is straight to your side, and be careful not to arch your back. Your arm should stay about 30 degrees to the front of your side. 1. Stand with your side to a wall so that your fingers can just touch it at an angle about 30 degrees toward the front of your body. 2. Walk the fingers of your injured arm up the wall as high as pain permits. Try not to shrug your shoulder up toward your ear as you move your arm up. 3. Hold that position for a count of at least 15 to 20. 
4. Walk your fingers back down to the starting position. 5. Repeat at least 2 to 4 times. Try to reach higher each time. Wall climbing (to the front) Note: During this stretching exercise, be careful not to arch your back. 1. Face a wall, and stand so your fingers can just touch it. 2. Keeping your shoulder down, walk the fingers of your injured arm up the wall as high as pain permits. (Don't shrug your shoulder up toward your ear.) 3. Hold your arm in that position for at least 15 to 30 seconds. 4. Slowly walk your fingers back down to where you started. 5. Repeat at least 2 to 4 times. Try to reach higher each time. Shoulder blade squeeze 1. Stand with your arms at your sides, and squeeze your shoulder blades together. Do not raise your shoulders up as you squeeze. 2. Hold 6 seconds. 3. Repeat 8 to 12 times. Scapular exercise: Arm reach 1. Lie flat on your back. This exercise is a very slight motion that starts with your arms raised (elbows straight, arms straight). 2. From this position, reach higher toward the good or ceiling. Keep your elbows straight. All motion should be from your shoulder blade only. 3. Relax your arms back to where you started. 4. Repeat 8 to 12 times. Arm raise to the side Note: During this strengthening exercise, your arm should stay about 30 degrees to the front of your side. 1. Slowly raise your injured arm to the side, with your thumb facing up. Raise your arm 60 degrees at the most (shoulder level is 90 degrees). 2. Hold the position for 3 to 5 seconds. Then lower your arm back to your side. If you need to, bring your \"good\" arm across your body and place it under the elbow as you lower your injured arm. Use your good arm to keep your injured arm from dropping down too fast. 
3. Repeat 8 to 12 times. 4. When you first start out, don't hold any extra weight in your hand. As you get stronger, you may use a 1-pound to 2-pound dumbbell or a small can of food. Shoulder flexor and extensor exercise Note: These are isometric exercises. That means you contract your muscles without actually moving. · Push forward (flex): Stand facing a wall or doorjamb, about 6 inches or less back. Hold your injured arm against your body. Make a closed fist with your thumb on top. Then gently push your hand forward into the wall with about 25% to 50% of your strength. Don't let your body move backward as you push. Hold for about 6 seconds. Relax for a few seconds. Repeat 8 to 12 times. · Push backward (extend): Stand with your back flat against a wall. Your upper arm should be against the wall, with your elbow bent 90 degrees (your hand straight ahead). Push your elbow gently back against the wall with about 25% to 50% of your strength.  Don't let your body move forward as you push. Hold for about 6 seconds. Relax for a few seconds. Repeat 8 to 12 times. Scapular exercise: Wall push-ups Note: This exercise is best done with your fingers somewhat turned out, rather than straight up and down. 1. Stand facing a wall, about 12 inches to 18 inches away. 2. Place your hands on the wall at shoulder height. 3. Slowly bend your elbows and bring your face to the wall. Keep your back and hips straight. 4. Push back to where you started. 5. Repeat 8 to 12 times. 6. When you can do this exercise against a wall comfortably, you can try it against a counter. You can then slowly progress to the end of a couch, then to a sturdy chair, and finally to the floor. Scapular exercise: Retraction Note: For this exercise, you will need elastic exercise material, such as surgical tubing or Thera-Band. 1. Put the band around a solid object at about waist level. (A bedpost will work well.) Each hand should hold an end of the band. 2. With your elbows at your sides and bent to 90 degrees, pull the band back. Your shoulder blades should move toward each other. Then move your arms back where you started. 3. Repeat 8 to 12 times. 4. If you have good range of motion in your shoulders, try this exercise with your arms lifted out to the sides. Keep your elbows at a 90-degree angle. Raise the elastic band up to about shoulder level. Pull the band back to move your shoulder blades toward each other. Then move your arms back where you started. Internal rotator strengthening exercise 1. Start by tying a piece of elastic exercise material to a doorknob. You can use surgical tubing or Thera-Band. 2. Stand or sit with your shoulder relaxed and your elbow bent 90 degrees. Your upper arm should rest comfortably against your side. Squeeze a rolled towel between your elbow and your body for comfort. This will help keep your arm at your side. 3. Hold one end of the elastic band in the hand of the painful arm. 4. Slowly rotate your forearm toward your body until it touches your belly. Slowly move it back to where you started. 5. Keep your elbow and upper arm firmly tucked against the towel roll or at your side. 6. Repeat 8 to 12 times. External rotator strengthening exercise 1. Start by tying a piece of elastic exercise material to a doorknob. You can use surgical tubing or Thera-Band. (You may also hold one end of the band in each hand.) 2. Stand or sit with your shoulder relaxed and your elbow bent 90 degrees. Your upper arm should rest comfortably against your side. Squeeze a rolled towel between your elbow and your body for comfort. This will help keep your arm at your side. 3. Hold one end of the elastic band with the hand of the painful arm. 4. Start with your forearm across your belly. Slowly rotate the forearm out away from your body. Keep your elbow and upper arm tucked against the towel roll or the side of your body until you begin to feel tightness in your shoulder. Slowly move your arm back to where you started. 5. Repeat 8 to 12 times. Follow-up care is a key part of your treatment and safety. Be sure to make and go to all appointments, and call your doctor if you are having problems. It's also a good idea to know your test results and keep a list of the medicines you take. Where can you learn more? Go to http://rosy-sancho.info/. Enter Akil Armas in the search box to learn more about \"Rotator Cuff: Exercises. \" Current as of: May 23, 2016 Content Version: 11.1 © 7103-1628 [employer]. Care instructions adapted under license by [employer] (which disclaims liability or warranty for this information).  If you have questions about a medical condition or this instruction, always ask your healthcare professional. [place] [address] any warranty or liability for your use of this information. Please provide this summary of care documentation to your next provider. Your primary care clinician is listed as Ifeanyi Prince. If you have any questions after today's visit, please call [phone].

## 2017-03-29 RX ORDER — POLYETHYLENE GLYCOL 3350 17 G/17G
POWDER, FOR SOLUTION ORAL
Qty: 600 G | Refills: 11 | Status: SHIPPED | OUTPATIENT
Start: 2017-03-29 | End: 2017-06-14 | Stop reason: SDUPTHER

## 2017-04-07 RX ORDER — LISINOPRIL 2.5 MG/1
TABLET ORAL
Qty: 30 TAB | Refills: 11 | Status: SHIPPED | OUTPATIENT
Start: 2017-04-07

## 2017-04-13 ENCOUNTER — TELEPHONE (OUTPATIENT)
Dept: FAMILY MEDICINE CLINIC | Age: 82
End: 2017-04-13

## 2017-04-13 NOTE — TELEPHONE ENCOUNTER
Kortney calling to speak with a nurse for advice on treating hay fever for patient.  She can be reached at [phone]

## 2017-04-13 NOTE — TELEPHONE ENCOUNTER
Kortney calling stating that Ms. Chavez is currently taking Allegra 180mg in the am.  She is wanting to know if she can take an additional 60mg in the pm.  Per Dr Bryant Wolfe ok to take an addition 60mg. Kortney verbalized understanding.

## 2017-04-17 ENCOUNTER — TELEPHONE (OUTPATIENT)
Dept: FAMILY MEDICINE CLINIC | Age: 82
End: 2017-04-17

## 2017-04-17 ENCOUNTER — OFFICE VISIT (OUTPATIENT)
Dept: FAMILY MEDICINE CLINIC | Age: 82
End: 2017-04-17

## 2017-04-17 VITALS
BODY MASS INDEX: 24.4 KG/M2 | TEMPERATURE: 98.1 F | RESPIRATION RATE: 18 BRPM | DIASTOLIC BLOOD PRESSURE: 82 MMHG | HEART RATE: 70 BPM | WEIGHT: 151.2 LBS | OXYGEN SATURATION: 94 % | SYSTOLIC BLOOD PRESSURE: 140 MMHG

## 2017-04-17 DIAGNOSIS — N95.2 ATROPHIC VAGINITIS: ICD-10-CM

## 2017-04-17 DIAGNOSIS — F03.90 DEMENTIA WITHOUT BEHAVIORAL DISTURBANCE, UNSPECIFIED DEMENTIA TYPE: ICD-10-CM

## 2017-04-17 DIAGNOSIS — R44.3 HALLUCINATION: Primary | ICD-10-CM

## 2017-04-17 LAB
BILIRUB UR QL STRIP: NEGATIVE
GLUCOSE UR-MCNC: NEGATIVE MG/DL
KETONES P FAST UR STRIP-MCNC: NEGATIVE MG/DL
PH UR STRIP: 6.5 [PH] (ref 4.6–8)
PROT UR QL STRIP: NEGATIVE MG/DL
SP GR UR STRIP: 1.02 (ref 1–1.03)
UA UROBILINOGEN AMB POC: NORMAL (ref 0.2–1)
URINALYSIS CLARITY POC: CLEAR
URINALYSIS COLOR POC: YELLOW
URINE BLOOD POC: NEGATIVE
URINE LEUKOCYTES POC: NEGATIVE
URINE NITRITES POC: NEGATIVE

## 2017-04-17 NOTE — TELEPHONE ENCOUNTER
Allen Nunez says that patient fell on side Friday night but was acting very strange and confused that day as well. she was acting delirious and hallucinating between 1am and 5 am [phone]

## 2017-04-17 NOTE — PATIENT INSTRUCTIONS
Helping A Person With Dementia: Care Instructions  Your Care Instructions  Dementia is a loss of mental skills that affects daily life. It is different from mild memory loss that occurs with aging. Dementia can cause problems with memory, thinking clearly, and planning. It is different for everyone. But it usually gets worse slowly. Some people who have dementia can function well for a long time. But at some point it may become hard for the person to care for himself or herself. It can be upsetting to learn that a loved one has this condition. You may be afraid and worried about what will happen. You may wonder how you will care for the person. There is no cure for dementia. But medicine may be able to slow memory loss and improve thinking for a while. Other medicines may help with sleep, depression, and behavior changes. Dementia is different for everyone. In some cases, people can function well for a long time. You can help your loved one by making his or her home life easier and safer. You also need to take care of yourself. Caregiving can be stressful. But support is available to help you and give you a break when you need it. The Alzheimer's Association offers good information and support. If you are caring for someone with dementia, you can help make life safer and more comfortable. You can also help your loved one make decisions about future care. You may also want to bring up legal and financial issues. These are hard but important conversations to have. Follow-up care is a key part of your loved one's treatment and safety. Be sure to make and go to all appointments, and call your doctor if your loved one is having problems. It's also a good idea to know your loved one's test results and keep a list of the medicines he or she takes. How can you care for your loved one at home? Taking care of the person  · If the person takes medicine for dementia, help him or her take it exactly as prescribed. Call the doctor if you notice any problems with the medicine. · Make a list of the person's medicines. Review it with all of his or her doctors. · Help the person eat a balanced diet. Serve plenty of whole grains, fruits, and vegetables every day. If the person is not hungry at mealtimes, give snacks at midmorning and in the afternoon. Offer drinks such as Boost, Ensure, or Sustacal if the person is losing weight. · Encourage exercise. Walking and other activities may slow the decline of mental ability. Help the person stay active mentally with reading, crossword puzzles, or other hobbies. · Take steps to help if the person is sundowning. This is the restless behavior and trouble with sleeping that may occur in late afternoon and at night. Try not to let the person nap during the day. Offer a glass of warm milk or caffeine-free tea before bedtime. · Develop a routine. Your loved one will feel less frustrated or confused with a clear, simple plan of what to do every day. · Be patient. A task may take the person longer than it used to. · For as long as he or she is able, allow your loved one to make decisions about activities, food, clothing, and other choices. Let him or her be independent, even if tasks take more time or are not done perfectly. Tailor tasks to the person's abilities. For example, if cooking is no longer safe, ask for other help. Your loved one can help set the table, or make simple dishes such as a salad. When the person needs help, offer it gently. Staying safe  · Make your home (or your loved one's home) safe. Tack down rugs, and put no-slip tape in the tub. Install handrails, and put safety switches on stoves and appliances. Keep rooms free of clutter. Make sure walkways around furniture are clear. Do not move furniture around, because the person may become confused. · Use locks on doors and cupboards.  Lock up knives, scissors, medicines, cleaning supplies, and other dangerous things. · Do not let the person drive or cook if he or she can't do it safely. A person with dementia should not drive unless he or she is able to pass an on-road driving test. Your state 's license bureau can do a driving test if there is any question. · Get medical alert jewelry for the person so that you can be contacted if he or she wanders away. If possible, provide a safe place for wandering, such as an enclosed yard or garden. Taking care of yourself  · Ask your doctor about support groups and other resources in your area. · Take care of your health. Be sure to eat healthy foods and get enough rest and exercise. · Take time for yourself. Respite services provide someone to stay with the person for a short time while you get out of the house for a few hours. · Make time for an activity that you enjoy. Read, listen to music, paint, do crafts, or play an instrument, even if it's only for a few minutes a day. · Spend time with family, friends, and others in your support system. When should you call for help? Call 911 anytime you think the person may need emergency care. For example, call if:  · The person who has dementia wanders away and you can't find him or her. · The person who has dementia is seriously injured. Call the doctor now or seek immediate medical care if:  · The person suddenly sees things that are not there (hallucinates). · The person has a sudden change in his or her behavior. Watch closely for changes in the person's health, and be sure to contact the doctor if:  · The person has symptoms that could cause injury. · The person has problems with his or her medicine. · You need more information to care for a person with dementia. · You need respite care so you can take a break. Where can you learn more? Go to http://rosy-sancho.info/. Enter K169 in the search box to learn more about \"Helping A Person With Dementia: Care Instructions. \"  Current as of: July 26, 2016  Content Version: 11.2  © 3380-1169 [employer]. Care instructions adapted under license by [employer] (which disclaims liability or warranty for this information). If you have questions about a medical condition or this instruction, always ask your healthcare professional. [address] any warranty or liability for your use of this information. Atrophic Vaginitis: Care Instructions  Your Care Instructions    Atrophic vaginitis is an irritation of the vagina. It's caused by thinning tissues and less moisture in the vaginal walls. It often happens during menopause when hormone levels change. Surgery to remove the ovaries also can cause it. Your doctor may do tests to rule out other causes. And you may get tests to measure your hormone levels. The problem is most often treated with the hormone estrogen. It comes in a cream, tablets, or a soft plastic ring that is placed in the vagina. Follow-up care is a key part of your treatment and safety. Be sure to make and go to all appointments, and call your doctor if you are having problems. It's also a good idea to know your test results and keep a list of the medicines you take. How can you care for yourself at home? · Use a water-based lubricant for your vagina if sex is dry or painful. Examples are Astroglide, Wet Lubricant Gel, and K-Y Jelly. · Talk with your doctor about using low-dose vaginal estrogen. It treats dryness and thinning tissue. · Do not douche. · Having sex improves blood flow to the vagina. This helps keep your tissue healthy. · Wipe from front to back after you use the toilet. This stops the spread of bacteria to your vagina. When should you call for help? Watch closely for changes in your health, and be sure to contact your doctor if:  · You have unexpected vaginal bleeding. · You do not get better as expected. Where can you learn more?   Go to http://rosy-sancho.info/. Enter R330 in the search box to learn more about \"Atrophic Vaginitis: Care Instructions. \"  Current as of: October 13, 2016  Content Version: 11.2  © 2810-2174 [employer]. Care instructions adapted under license by [employer] (which disclaims liability or warranty for this information). If you have questions about a medical condition or this instruction, always ask your healthcare professional. [address] any warranty or liability for your use of this information.

## 2017-04-17 NOTE — PROGRESS NOTES
Subjective:     Chief Complaint   Patient presents with    Fall     4/10/17 and 4/14/17    Hallucinations        She  is a 80 y.o. female here with care taker here for evaluation after having a couple falls and some \"hallucinations\". Patient has progressive Dementia and has a live in caretaker who notes that she has had a couple episodes of hallucinations in the middle of the night and and then noted to have falls on 4/10/17 and 4/14/17. Caretaker notes that she had a bruise to the left hip and right knee from the two falls (unwitnessed, caregiver heard the fall). She has a mild rug abrasion to the right shoulder and right forehead and \"maybe some to the right knee\" that occurred when she fell on the carpet. No LOC and caretaker was there immediately. She also has a bruise to the left posterior upper thigh (slipped out of chair on 4/10/17)  and no complaints or limitations in leg or hip movement. She has had similar episodes in the past where she was noted to be hallucinating in the middle of the night with fever and was seen by Dr Charley Patel, labs were done and all normal.  At that time, Dr Charley Patel explained typical progression/ worsening of symptoms that usually accompanies worsening dementia. The caretaker was told that she could use clonazepam in the middle of the night if needed and so the caretaker says she has given her an extra dose at times and notes that she is \"less with it\" when she does so. Caretaker wonders if she has UTI, she has also had intermittent complaints of vaginal irritation. Currently doing PT for her knees. Has not done any of the recommendations for the left shoulder pain that she was evaluated for by Dr Obey Christianson on 3/24/17. She is eating well but care taker notes that she is not drinking as much. Has had some allergy symptoms and notes that the medications tend to make her sleepy.        ROS  Gen - no fever/chills  Resp - no dyspnea or cough  CV - no chest pain or TONEY  Rest per HPI    Past Medical History:   Diagnosis Date    Allergic rhinitis     Atrophic vaginitis     Basal cell carcinoma     Constipation     Dementia     lives with caregiver, De Villalobos    Depression     Essential hypertension     GERD (gastroesophageal reflux disease)     Hypothyroidism      Past Surgical History:   Procedure Laterality Date    HX CHOLECYSTECTOMY      HX HYSTERECTOMY      HX TONSIL AND ADENOIDECTOMY       Current Outpatient Prescriptions on File Prior to Visit   Medication Sig Dispense Refill    lisinopril (PRINIVIL, ZESTRIL) 2.5 mg tablet TAKE 1 TABLET BY MOUTH DAILY FOR BLOOD PRESSURE 30 Tab 11    polyethylene glycol (MIRALAX) 17 gram/dose powder MIX 1/2 TO 1 CAPFUL WITH 6 TO 8 OUNCES FLUID DAILY FOR CONSTIPATION 600 g 11    fexofenadine (ALLEGRA) 180 mg tablet Take  by mouth.  meloxicam (MOBIC) 15 mg tablet Take 15 mg by mouth daily.  fluticasone (FLONASE) 50 mcg/actuation nasal spray 2 Sprays by Both Nostrils route daily.  metoprolol succinate (TOPROL-XL) 25 mg XL tablet Take 0.5 Tabs by mouth nightly. Replaced Coreg 30 Tab 6    fluticasone (FLONASE) 50 mcg/actuation nasal spray INSTILL 2 SPRAYS IN EACH NOSTIRL ONCE DAILY 1 Bottle 11    clonazePAM (KLONOPIN) 0.5 mg tablet TAKE 1 TABLET BY MOUTH EVERY EVENING ONLY AS NEEDED FOR ANXIETY 90 Tab 0    memantine (NAMENDA) 10 mg tablet Take 1 Tab by mouth daily. 90 Tab 3    levothyroxine (SYNTHROID) 112 mcg tablet Takes 5 days a week. (Not Monday and Friday) 75 Tab 3    ranitidine (ZANTAC) 150 mg tablet TAKE 1 TABLET BY MOUTH TWICE DAILY FOR STOMACH ACID NEEDS APPOINTMENT 60 Tab 11    citalopram (CELEXA) 20 mg tablet TAKE 2 TABLETS BY MOUTH DAILY FOR MOOD 180 Tab 3    Calcium Carbonate-Vit D3-Min 600 mg (1,500 mg)-400 unit chew Take  by mouth.  docusate sodium 100 mg tab Take  by mouth.  conjugated estrogens (PREMARIN) 0.625 mg tablet Take 0.625 mg by mouth.  Twice weekly      polyethylene glycol (MIRALAX) 17 gram packet Take 17 g by mouth. No current facility-administered medications on file prior to visit. Objective:     Vitals:    04/17/17 1414   BP: 140/82   Pulse: 70   Resp: 18   Temp: 98.1 °F (36.7 °C)   SpO2: 94%   Weight: 151 lb 3.2 oz (68.6 kg)       Gen: alert, no acute distress  Head: normocephalic, atraumatic  Ears: external auditory canals clear, TMs without erythema or effusion  Eyes: pupils equal round reactive to light, sclera clear, conjunctiva clear  Nose: normal turbinates, no rhinorrhea  Oral: moist mucus membranes, no oral lesions, no pharyngeal inflammation or exudate  Neck: supple, no lymphadenopathy  Resp: no increased work of breathing, lungs clear to ausculation bilaterally  CV: S1, S2 normal, no murmurs, rubs, or gallops. Abd: soft, not tender, not distended. No hepatosplenomegaly. Normal bowel sounds. No hernias. Neuro: cranial nerves intact, normal strength and movement in all extremities, reflexes and sensation intact and symmetric. Skin: no lesion or rash  Musc: there is a healing bruise to the left posterior upper thigh, no swelling seen. No pain around bony structures. Has full ROM of lower extremities and hips. Complains of left shoulder pain, was told that she had a rotator cuff strain, has not done rehab exercises or applied ice to the area as advised.        Results for orders placed or performed in visit on 04/17/17   AMB POC URINALYSIS DIP STICK AUTO W/O MICRO   Result Value Ref Range    Color (UA POC) Yellow     Clarity (UA POC) Clear     Glucose (UA POC) Negative Negative    Bilirubin (UA POC) Negative Negative    Ketones (UA POC) Negative Negative    Specific gravity (UA POC) 1.020 1.001 - 1.035    Blood (UA POC) Negative Negative    pH (UA POC) 6.5 4.6 - 8.0    Protein (UA POC) Negative Negative mg/dL    Urobilinogen (UA POC) 0.2 mg/dL 0.2 - 1    Nitrites (UA POC) Negative Negative    Leukocyte esterase (UA POC) Negative Negative         Assessment/ Plan:   Differential diagnosis and treatment options reviewed with patient who is in agreement with treatment plan as outlined below. ICD-10-CM ICD-9-CM    1. Hallucination R44.3 780.1 AMB POC URINALYSIS DIP STICK AUTO W/O MICRO   2. Dementia without behavioral disturbance, unspecified dementia type F03.90 294.20    3. Atrophic vaginitis N95.2 627.3        Advised caretaker that dementia could be worsening and that she will likely need to have more supervision to prevent falls. No UTI or signs of infections that would cause her decline in mental status. Discussed that the use of clonazepam could potentially be sedating and cause increased confusion. Urine is completely normal today. Encouraged her to increase her hydration and offer small frequent healthy meals/snacks to maintain nutrition. Encouraged use of OTC Vagisil or A&D for external excoriation. Continue use of premarin cream twice per week. Encouraged sensitive skin soap only. Continue PT. Will follow up if pain in left shoulder does not improve with use of ice and stretches/exercises discussed at last appointment. I have discussed the diagnosis with the patient and the intended plan as seen in the above orders. The patient has received an after-visit summary and questions were answered concerning future plans. I have discussed medication side effects and warnings with the patient as well. The patient verbalizes understanding and agreement with the plan. Follow-up Disposition:  Return in about 4 weeks (around 5/15/2017), or if symptoms worsen or fail to improve.

## 2017-04-17 NOTE — MR AVS SNAPSHOT
Visit Information Date & Time Provider Department Dept. Phone Encounter #  
 4/17/2017  2:00 PM Ani Parker, [address] [address] [phone] [id number] Follow-up Instructions Return in about 4 weeks (around 5/15/2017), or if symptoms worsen or fail to improve. Your Appointments 6/14/2017 10:30 AM  
3 MONTH with Rachel Hagan, MD [facility] [address] [facility]) Appt Note: Per Dr Edda Palma $0CP REM  
 [address]  
[phone] [address] Upcoming Health Maintenance Date Due DTaP/Tdap/Td series (1 - Tdap) 2/1/1953 GLAUCOMA SCREENING Q2Y 2/1/1997 MEDICARE YEARLY EXAM 3/8/2018 Allergies as of 4/17/2017  Review Complete On: 4/17/2017 By: Ani Parker NP Severity Noted Reaction Type Reactions Sulfa (Sulfonamide Antibiotics)  06/21/2016    Not Reported This Time Current Immunizations  Reviewed on 6/6/2016 Name Date Influenza High Dose Vaccine PF 10/3/2016 Pneumococcal Conjugate (PCV-13) 6/6/2016 Pneumococcal Polysaccharide (PPSV-23) 11/3/2014 Zoster Vaccine, Live 12/3/2012 Not reviewed this visit You Were Diagnosed With   
  
 Codes Comments Hallucination    -  Primary ICD-10-CM: R44.3 ICD-9-CM: 780.1 Dementia without behavioral disturbance, unspecified dementia type     ICD-10-CM: F03.90 ICD-9-CM: 294.20 Atrophic vaginitis     ICD-10-CM: N95.2 ICD-9-CM: 627.3 Vitals BP Pulse Temp Resp Weight(growth percentile) SpO2  
 140/82 (BP 1 Location: Left arm, BP Patient Position: Sitting) 70 98.1 °F (36.7 °C) 18 151 lb 3.2 oz (68.6 kg) 94% BMI OB Status Smoking Status 24.4 kg/m2 Hysterectomy Never Smoker Vitals History BMI and BSA Data Body Mass Index Body Surface Area  
 24.4 kg/m 2 1.79 m 2 Preferred Pharmacy Pharmacy Name Phone [facility], [address] [phone] Your Updated Medication List  
  
   
This list is accurate as of: 4/17/17  3:16 PM.  Always use your most recent med list.  
  
  
  
  
 Calcium Carbonate-Vit D3-Min 600 mg (1,500 mg)-400 unit Chew Take  by mouth.  
  
 citalopram 20 mg tablet Commonly known as:  CELEXA  
TAKE 2 TABLETS BY MOUTH DAILY FOR MOOD  
  
 clonazePAM 0.5 mg tablet Commonly known as:  KlonoPIN  
TAKE 1 TABLET BY MOUTH EVERY EVENING ONLY AS NEEDED FOR ANXIETY  
  
 conjugated estrogens 0.625 mg tablet Commonly known as:  PREMARIN Take 0.625 mg by mouth. Twice weekly  
  
 docusate sodium 100 mg Tab Take  by mouth. fexofenadine 180 mg tablet Commonly known as:  Peggyann Charis Take  by mouth. * fluticasone 50 mcg/actuation nasal spray Commonly known as:  Stefani Six 2 Sprays by Both Nostrils route daily. * fluticasone 50 mcg/actuation nasal spray Commonly known as:  Stefani Six INSTILL 2 SPRAYS IN EACH NOSTIRL ONCE DAILY  
  
 levothyroxine 112 mcg tablet Commonly known as:  SYNTHROID Takes 5 days a week. (Not Monday and Friday)  
  
 lisinopril 2.5 mg tablet Commonly known as:  PRINIVIL, ZESTRIL  
TAKE 1 TABLET BY MOUTH DAILY FOR BLOOD PRESSURE  
  
 memantine 10 mg tablet Commonly known as:  Jonathan Tilleye Take 1 Tab by mouth daily. metoprolol succinate 25 mg XL tablet Commonly known as:  TOPROL-XL Take 0.5 Tabs by mouth nightly. Replaced Coreg * MIRALAX 17 gram packet Generic drug:  polyethylene glycol Take 17 g by mouth. * polyethylene glycol 17 gram/dose powder Commonly known as:  Lynder Glad MIX 1/2 TO 1 CAPFUL WITH 6 TO 8 OUNCES FLUID DAILY FOR CONSTIPATION  
  
 MOBIC 15 mg tablet Generic drug:  meloxicam  
Take 15 mg by mouth daily. raNITIdine 150 mg tablet Commonly known as:  ZANTAC TAKE 1 TABLET BY MOUTH TWICE DAILY FOR STOMACH ACID NEEDS APPOINTMENT * Notice: This list has 4 medication(s) that are the same as other medications prescribed for you. Read the directions carefully, and ask your doctor or other care provider to review them with you. We Performed the Following AMB POC URINALYSIS DIP STICK AUTO W/O MICRO [80606 CPT(R)] Follow-up Instructions Return in about 4 weeks (around 5/15/2017), or if symptoms worsen or fail to improve. Patient Instructions Helping A Person With Dementia: Care Instructions Your Care Instructions Dementia is a loss of mental skills that affects daily life. It is different from mild memory loss that occurs with aging. Dementia can cause problems with memory, thinking clearly, and planning. It is different for everyone. But it usually gets worse slowly. Some people who have dementia can function well for a long time. But at some point it may become hard for the person to care for himself or herself. It can be upsetting to learn that a loved one has this condition. You may be afraid and worried about what will happen. You may wonder how you will care for the person. There is no cure for dementia. But medicine may be able to slow memory loss and improve thinking for a while. Other medicines may help with sleep, depression, and behavior changes. Dementia is different for everyone. In some cases, people can function well for a long time. You can help your loved one by making his or her home life easier and safer. You also need to take care of yourself. Caregiving can be stressful. But support is available to help you and give you a break when you need it. The Alzheimer's Association offers good information and support. If you are caring for someone with dementia, you can help make life safer and more comfortable. You can also help your loved one make decisions about future care. You may also want to bring up legal and financial issues. These are hard but important conversations to have. Follow-up care is a key part of your loved one's treatment and safety. Be sure to make and go to all appointments, and call your doctor if your loved one is having problems. It's also a good idea to know your loved one's test results and keep a list of the medicines he or she takes. How can you care for your loved one at home? Taking care of the person · If the person takes medicine for dementia, help him or her take it exactly as prescribed. Call the doctor if you notice any problems with the medicine. · Make a list of the person's medicines. Review it with all of his or her doctors. · Help the person eat a balanced diet. Serve plenty of whole grains, fruits, and vegetables every day. If the person is not hungry at mealtimes, give snacks at midmorning and in the afternoon. Offer drinks such as Boost, Ensure, or Sustacal if the person is losing weight. · Encourage exercise. Walking and other activities may slow the decline of mental ability. Help the person stay active mentally with reading, crossword puzzles, or other hobbies. · Take steps to help if the person is sundowning. This is the restless behavior and trouble with sleeping that may occur in late afternoon and at night. Try not to let the person nap during the day. Offer a glass of warm milk or caffeine-free tea before bedtime. · Develop a routine. Your loved one will feel less frustrated or confused with a clear, simple plan of what to do every day. · Be patient. A task may take the person longer than it used to. · For as long as he or she is able, allow your loved one to make decisions about activities, food, clothing, and other choices. Let him or her be independent, even if tasks take more time or are not done perfectly. Tailor tasks to the person's abilities. For example, if cooking is no longer safe, ask for other help. Your loved one can help set the table, or make simple dishes such as a salad. When the person needs help, offer it gently. Staying safe · Make your home (or your loved one's home) safe. Tack down rugs, and put no-slip tape in the tub. Install handrails, and put safety switches on stoves and appliances. Keep rooms free of clutter. Make sure walkways around furniture are clear. Do not move furniture around, because the person may become confused. · Use locks on doors and cupboards. Lock up knives, scissors, medicines, cleaning supplies, and other dangerous things. · Do not let the person drive or cook if he or she can't do it safely. A person with dementia should not drive unless he or she is able to pass an on-road driving test. Your state 's license bureau can do a driving test if there is any question. · Get medical alert jewelry for the person so that you can be contacted if he or she wanders away. If possible, provide a safe place for wandering, such as an enclosed yard or garden. Taking care of yourself · Ask your doctor about support groups and other resources in your area. · Take care of your health. Be sure to eat healthy foods and get enough rest and exercise. · Take time for yourself. Respite services provide someone to stay with the person for a short time while you get out of the house for a few hours. · Make time for an activity that you enjoy. Read, listen to music, paint, do crafts, or play an instrument, even if it's only for a few minutes a day. · Spend time with family, friends, and others in your support system. When should you call for help? Call 911 anytime you think the person may need emergency care. For example, call if: · The person who has dementia wanders away and you can't find him or her. · The person who has dementia is seriously injured. Call the doctor now or seek immediate medical care if: · The person suddenly sees things that are not there (hallucinates). · The person has a sudden change in his or her behavior. Watch closely for changes in the person's health, and be sure to contact the doctor if: · The person has symptoms that could cause injury. · The person has problems with his or her medicine. · You need more information to care for a person with dementia. · You need respite care so you can take a break. Where can you learn more? Go to http://rosy-sancho.info/. Enter N212 in the search box to learn more about \"Helping A Person With Dementia: Care Instructions. \" Current as of: July 26, 2016 Content Version: 11.2 © 7170-6412 [employer]. Care instructions adapted under license by [employer] (which disclaims liability or warranty for this information). If you have questions about a medical condition or this instruction, always ask your healthcare professional. [address] any warranty or liability for your use of this information. Atrophic Vaginitis: Care Instructions Your Care Instructions Atrophic vaginitis is an irritation of the vagina. It's caused by thinning tissues and less moisture in the vaginal walls. It often happens during menopause when hormone levels change. Surgery to remove the ovaries also can cause it. Your doctor may do tests to rule out other causes. And you may get tests to measure your hormone levels. The problem is most often treated with the hormone estrogen. It comes in a cream, tablets, or a soft plastic ring that is placed in the vagina. Follow-up care is a key part of your treatment and safety. Be sure to make and go to all appointments, and call your doctor if you are having problems. It's also a good idea to know your test results and keep a list of the medicines you take. How can you care for yourself at home? · Use a water-based lubricant for your vagina if sex is dry or painful. Examples are Astroglide, Wet Lubricant Gel, and K-Y Jelly. · Talk with your doctor about using low-dose vaginal estrogen. It treats dryness and thinning tissue. · Do not douche. · Having sex improves blood flow to the vagina. This helps keep your tissue healthy. · Wipe from front to back after you use the toilet. This stops the spread of bacteria to your vagina. When should you call for help? Watch closely for changes in your health, and be sure to contact your doctor if: 
· You have unexpected vaginal bleeding. · You do not get better as expected. Where can you learn more? Go to http://rosy-sancho.info/. Enter R330 in the search box to learn more about \"Atrophic Vaginitis: Care Instructions. \" Current as of: October 13, 2016 Content Version: 11.2 © 0281-4952 [employer]. Care instructions adapted under license by [employer] (which disclaims liability or warranty for this information). If you have questions about a medical condition or this instruction, always ask your healthcare professional. [address] any warranty or liability for your use of this information. Please provide this summary of care documentation to your next provider. Your primary care clinician is listed as Imelda Najjar. If you have any questions after today's visit, please call [phone].

## 2017-04-20 RX ORDER — CLONAZEPAM 0.5 MG/1
TABLET ORAL
Qty: 90 TAB | Refills: 0 | Status: SHIPPED | OUTPATIENT
Start: 2017-04-20 | End: 2017-07-17 | Stop reason: SDUPTHER

## 2017-04-25 ENCOUNTER — TELEPHONE (OUTPATIENT)
Dept: FAMILY MEDICINE CLINIC | Age: 82
End: 2017-04-25

## 2017-04-25 ENCOUNTER — HOME HEALTH ADMISSION (OUTPATIENT)
Dept: HOME HEALTH SERVICES | Facility: HOME HEALTH | Age: 82
End: 2017-04-25
Payer: MEDICARE

## 2017-04-25 DIAGNOSIS — F03.90 DEMENTIA WITHOUT BEHAVIORAL DISTURBANCE, UNSPECIFIED DEMENTIA TYPE: ICD-10-CM

## 2017-04-25 DIAGNOSIS — I50.22 SYSTOLIC CHF, CHRONIC (HCC): Primary | ICD-10-CM

## 2017-04-25 NOTE — TELEPHONE ENCOUNTER
Jody Lawrence calling to speak with [facility] about patient. She can be reached at [phone] and says to leave a message if she does not answer and she will return call.

## 2017-04-25 NOTE — PROGRESS NOTES
Call placed to [facility] to verify they can accept patient and they can. Referral placed via [facility].

## 2017-04-28 ENCOUNTER — HOME CARE VISIT (OUTPATIENT)
Dept: SCHEDULING | Facility: HOME HEALTH | Age: 82
End: 2017-04-28
Payer: MEDICARE

## 2017-04-28 PROCEDURE — 400013 HH SOC

## 2017-04-28 PROCEDURE — G0299 HHS/HOSPICE OF RN EA 15 MIN: HCPCS

## 2017-05-01 ENCOUNTER — HOME CARE VISIT (OUTPATIENT)
Dept: SCHEDULING | Facility: HOME HEALTH | Age: 82
End: 2017-05-01
Payer: MEDICARE

## 2017-05-01 VITALS
DIASTOLIC BLOOD PRESSURE: 70 MMHG | OXYGEN SATURATION: 96 % | HEART RATE: 76 BPM | RESPIRATION RATE: 18 BRPM | TEMPERATURE: 98.5 F | SYSTOLIC BLOOD PRESSURE: 116 MMHG

## 2017-05-01 PROCEDURE — G0156 HHCP-SVS OF AIDE,EA 15 MIN: HCPCS

## 2017-05-02 ENCOUNTER — HOME CARE VISIT (OUTPATIENT)
Dept: SCHEDULING | Facility: HOME HEALTH | Age: 82
End: 2017-05-02
Payer: MEDICARE

## 2017-05-02 ENCOUNTER — HOME CARE VISIT (OUTPATIENT)
Dept: HOME HEALTH SERVICES | Facility: HOME HEALTH | Age: 82
End: 2017-05-02
Payer: MEDICARE

## 2017-05-02 VITALS
DIASTOLIC BLOOD PRESSURE: 78 MMHG | OXYGEN SATURATION: 96 % | HEART RATE: 56 BPM | SYSTOLIC BLOOD PRESSURE: 160 MMHG | TEMPERATURE: 97.8 F

## 2017-05-02 PROCEDURE — G0300 HHS/HOSPICE OF LPN EA 15 MIN: HCPCS

## 2017-05-02 PROCEDURE — G0153 HHCP-SVS OF S/L PATH,EA 15MN: HCPCS

## 2017-05-03 ENCOUNTER — HOME CARE VISIT (OUTPATIENT)
Dept: SCHEDULING | Facility: HOME HEALTH | Age: 82
End: 2017-05-03
Payer: MEDICARE

## 2017-05-03 ENCOUNTER — CLINICAL SUPPORT (OUTPATIENT)
Dept: FAMILY MEDICINE CLINIC | Age: 82
End: 2017-05-03

## 2017-05-03 ENCOUNTER — TELEPHONE (OUTPATIENT)
Dept: FAMILY MEDICINE CLINIC | Age: 82
End: 2017-05-03

## 2017-05-03 VITALS
HEART RATE: 62 BPM | TEMPERATURE: 98 F | RESPIRATION RATE: 16 BRPM | BODY MASS INDEX: 24.46 KG/M2 | WEIGHT: 152.2 LBS | DIASTOLIC BLOOD PRESSURE: 64 MMHG | SYSTOLIC BLOOD PRESSURE: 142 MMHG | OXYGEN SATURATION: 94 % | HEIGHT: 66 IN

## 2017-05-03 VITALS
BODY MASS INDEX: 24.24 KG/M2 | HEART RATE: 66 BPM | OXYGEN SATURATION: 98 % | RESPIRATION RATE: 17 BRPM | DIASTOLIC BLOOD PRESSURE: 72 MMHG | TEMPERATURE: 98.2 F | WEIGHT: 150.2 LBS | SYSTOLIC BLOOD PRESSURE: 140 MMHG

## 2017-05-03 DIAGNOSIS — R30.0 BURNING WITH URINATION: Primary | ICD-10-CM

## 2017-05-03 LAB
BILIRUB UR QL STRIP: NEGATIVE
GLUCOSE UR-MCNC: NEGATIVE MG/DL
KETONES P FAST UR STRIP-MCNC: NEGATIVE MG/DL
PH UR STRIP: 7 [PH] (ref 4.6–8)
PROT UR QL STRIP: NEGATIVE MG/DL
SP GR UR STRIP: 1.01 (ref 1–1.03)
UA UROBILINOGEN AMB POC: NORMAL (ref 0.2–1)
URINALYSIS CLARITY POC: NORMAL
URINALYSIS COLOR POC: YELLOW
URINE BLOOD POC: NEGATIVE
URINE LEUKOCYTES POC: NEGATIVE
URINE NITRITES POC: NEGATIVE

## 2017-05-03 PROCEDURE — G0151 HHCP-SERV OF PT,EA 15 MIN: HCPCS

## 2017-05-03 NOTE — PROGRESS NOTES
Caregiver brought in urine sample that she said home health nurse told her to bring in because Ms. Chavez is having confusion, sleepiness, and burning on urination. Per Dr. Dimas Ramirez verbal order UA done and urine sent for culture. Because UA was normal no other orders at this time caregiver notified and voiced understanding.  She will also bring her in for an apt if symptoms continue or get worse

## 2017-05-04 ENCOUNTER — HOME CARE VISIT (OUTPATIENT)
Dept: SCHEDULING | Facility: HOME HEALTH | Age: 82
End: 2017-05-04
Payer: MEDICARE

## 2017-05-04 LAB
BACTERIA UR CULT: NORMAL
PLEASE NOTE:, 188601: NORMAL

## 2017-05-04 PROCEDURE — G0156 HHCP-SVS OF AIDE,EA 15 MIN: HCPCS

## 2017-05-05 ENCOUNTER — HOME CARE VISIT (OUTPATIENT)
Dept: SCHEDULING | Facility: HOME HEALTH | Age: 82
End: 2017-05-05
Payer: MEDICARE

## 2017-05-05 PROCEDURE — G0152 HHCP-SERV OF OT,EA 15 MIN: HCPCS

## 2017-05-05 PROCEDURE — G0156 HHCP-SVS OF AIDE,EA 15 MIN: HCPCS

## 2017-05-05 PROCEDURE — G0299 HHS/HOSPICE OF RN EA 15 MIN: HCPCS

## 2017-05-06 VITALS
TEMPERATURE: 98.2 F | OXYGEN SATURATION: 95 % | HEART RATE: 69 BPM | WEIGHT: 149.6 LBS | DIASTOLIC BLOOD PRESSURE: 80 MMHG | SYSTOLIC BLOOD PRESSURE: 140 MMHG | BODY MASS INDEX: 24.15 KG/M2

## 2017-05-08 ENCOUNTER — HOME CARE VISIT (OUTPATIENT)
Dept: SCHEDULING | Facility: HOME HEALTH | Age: 82
End: 2017-05-08
Payer: MEDICARE

## 2017-05-08 VITALS
TEMPERATURE: 98 F | OXYGEN SATURATION: 97 % | HEART RATE: 76 BPM | DIASTOLIC BLOOD PRESSURE: 70 MMHG | SYSTOLIC BLOOD PRESSURE: 130 MMHG

## 2017-05-08 VITALS
HEART RATE: 87 BPM | OXYGEN SATURATION: 98 % | DIASTOLIC BLOOD PRESSURE: 87 MMHG | SYSTOLIC BLOOD PRESSURE: 158 MMHG | TEMPERATURE: 97.9 F

## 2017-05-08 PROCEDURE — G0153 HHCP-SVS OF S/L PATH,EA 15MN: HCPCS

## 2017-05-09 ENCOUNTER — HOME CARE VISIT (OUTPATIENT)
Dept: SCHEDULING | Facility: HOME HEALTH | Age: 82
End: 2017-05-09
Payer: MEDICARE

## 2017-05-09 ENCOUNTER — HOME CARE VISIT (OUTPATIENT)
Dept: HOME HEALTH SERVICES | Facility: HOME HEALTH | Age: 82
End: 2017-05-09
Payer: MEDICARE

## 2017-05-09 PROCEDURE — G0151 HHCP-SERV OF PT,EA 15 MIN: HCPCS

## 2017-05-09 PROCEDURE — G0156 HHCP-SVS OF AIDE,EA 15 MIN: HCPCS

## 2017-05-10 VITALS
RESPIRATION RATE: 16 BRPM | TEMPERATURE: 97.4 F | OXYGEN SATURATION: 97 % | HEART RATE: 64 BPM | SYSTOLIC BLOOD PRESSURE: 152 MMHG | DIASTOLIC BLOOD PRESSURE: 76 MMHG | WEIGHT: 152 LBS | BODY MASS INDEX: 24.53 KG/M2

## 2017-05-11 ENCOUNTER — HOME CARE VISIT (OUTPATIENT)
Dept: SCHEDULING | Facility: HOME HEALTH | Age: 82
End: 2017-05-11
Payer: MEDICARE

## 2017-05-11 PROCEDURE — G0156 HHCP-SVS OF AIDE,EA 15 MIN: HCPCS

## 2017-05-11 PROCEDURE — G0151 HHCP-SERV OF PT,EA 15 MIN: HCPCS

## 2017-05-12 ENCOUNTER — HOME CARE VISIT (OUTPATIENT)
Dept: HOME HEALTH SERVICES | Facility: HOME HEALTH | Age: 82
End: 2017-05-12
Payer: MEDICARE

## 2017-05-12 ENCOUNTER — HOME CARE VISIT (OUTPATIENT)
Dept: SCHEDULING | Facility: HOME HEALTH | Age: 82
End: 2017-05-12
Payer: MEDICARE

## 2017-05-12 VITALS
DIASTOLIC BLOOD PRESSURE: 82 MMHG | SYSTOLIC BLOOD PRESSURE: 126 MMHG | HEART RATE: 56 BPM | TEMPERATURE: 97.4 F | OXYGEN SATURATION: 96 %

## 2017-05-12 VITALS
SYSTOLIC BLOOD PRESSURE: 122 MMHG | HEART RATE: 60 BPM | TEMPERATURE: 98.4 F | OXYGEN SATURATION: 97 % | RESPIRATION RATE: 16 BRPM | DIASTOLIC BLOOD PRESSURE: 68 MMHG

## 2017-05-12 PROCEDURE — G0299 HHS/HOSPICE OF RN EA 15 MIN: HCPCS

## 2017-05-31 ENCOUNTER — HOME CARE VISIT (OUTPATIENT)
Dept: SCHEDULING | Facility: HOME HEALTH | Age: 82
End: 2017-05-31
Payer: MEDICARE

## 2017-05-31 VITALS
BODY MASS INDEX: 24.73 KG/M2 | SYSTOLIC BLOOD PRESSURE: 110 MMHG | HEART RATE: 65 BPM | DIASTOLIC BLOOD PRESSURE: 70 MMHG | RESPIRATION RATE: 18 BRPM | WEIGHT: 153.2 LBS | OXYGEN SATURATION: 7 % | TEMPERATURE: 97.9 F

## 2017-05-31 PROCEDURE — G0156 HHCP-SVS OF AIDE,EA 15 MIN: HCPCS

## 2017-05-31 PROCEDURE — G0299 HHS/HOSPICE OF RN EA 15 MIN: HCPCS

## 2017-06-02 ENCOUNTER — HOME CARE VISIT (OUTPATIENT)
Dept: SCHEDULING | Facility: HOME HEALTH | Age: 82
End: 2017-06-02
Payer: MEDICARE

## 2017-06-02 PROCEDURE — G0156 HHCP-SVS OF AIDE,EA 15 MIN: HCPCS

## 2017-06-05 ENCOUNTER — HOME CARE VISIT (OUTPATIENT)
Dept: SCHEDULING | Facility: HOME HEALTH | Age: 82
End: 2017-06-05
Payer: MEDICARE

## 2017-06-06 ENCOUNTER — OFFICE VISIT (OUTPATIENT)
Dept: FAMILY MEDICINE CLINIC | Age: 82
End: 2017-06-06

## 2017-06-06 VITALS
BODY MASS INDEX: 24.11 KG/M2 | WEIGHT: 150 LBS | OXYGEN SATURATION: 95 % | RESPIRATION RATE: 18 BRPM | HEIGHT: 66 IN | DIASTOLIC BLOOD PRESSURE: 71 MMHG | SYSTOLIC BLOOD PRESSURE: 131 MMHG | TEMPERATURE: 98.1 F | HEART RATE: 61 BPM

## 2017-06-06 DIAGNOSIS — I50.22 SYSTOLIC CHF, CHRONIC (HCC): ICD-10-CM

## 2017-06-06 DIAGNOSIS — M17.0 ARTHRITIS OF BOTH KNEES: ICD-10-CM

## 2017-06-06 DIAGNOSIS — F03.90 DEMENTIA WITHOUT BEHAVIORAL DISTURBANCE, UNSPECIFIED DEMENTIA TYPE: Primary | ICD-10-CM

## 2017-06-06 DIAGNOSIS — I48.0 PAROXYSMAL ATRIAL FIBRILLATION (HCC): ICD-10-CM

## 2017-06-06 RX ORDER — MELOXICAM 7.5 MG/1
7.5 TABLET ORAL DAILY
Qty: 30 TAB | Refills: 11 | Status: SHIPPED | OUTPATIENT
Start: 2017-06-06

## 2017-06-06 NOTE — MR AVS SNAPSHOT
Visit Information Date & Time Provider Department Dept. Phone Encounter #  
 6/6/2017  2:00 PM Lucille Allen, [address] [facility] [phone] [id number] Your Appointments 6/14/2017 10:30 AM  
3 MONTH with Jame Gómez MD  
[facility] [address] [facility]) Appt Note: Per Dr Gene Awan $0CP REM  
 [address] [facility]  
[phone] [address] [facility] Upcoming Health Maintenance Date Due DTaP/Tdap/Td series (1 - Tdap) 2/1/1953 GLAUCOMA SCREENING Q2Y 2/1/1997 INFLUENZA AGE 9 TO ADULT 8/1/2017 MEDICARE YEARLY EXAM 3/8/2018 Allergies as of 6/6/2017  Review Complete On: 6/6/2017 By: Lucille Allen MD  
  
 Severity Noted Reaction Type Reactions Sulfa (Sulfonamide Antibiotics)  06/21/2016    Not Reported This Time Current Immunizations  Reviewed on 6/6/2016 Name Date Influenza High Dose Vaccine PF 10/3/2016 Pneumococcal Conjugate (PCV-13) 6/6/2016 Pneumococcal Polysaccharide (PPSV-23) 11/3/2014 Zoster Vaccine, Live 12/3/2012 Not reviewed this visit Vitals BP Pulse Temp Resp Height(growth percentile) Weight(growth percentile) 131/71 (BP 1 Location: Left arm, BP Patient Position: Sitting) 61 98.1 °F (36.7 °C) (Oral) 18 5' 6\" (1.676 m) 150 lb (68 kg) SpO2 BMI OB Status Smoking Status 95% 24.21 kg/m2 Hysterectomy Never Smoker BMI and BSA Data Body Mass Index Body Surface Area  
 24.21 kg/m 2 1.78 m 2 Preferred Pharmacy Pharmacy Name Phone [facility], [address] [phone] Your Updated Medication List  
  
   
This list is accurate as of: 6/6/17  3:05 PM.  Always use your most recent med list.  
  
  
  
  
 Calcium Carbonate-Vit D3-Min 600 mg (1,500 mg)-400 unit Chew Take  by mouth.  
  
 citalopram 20 mg tablet Commonly known as:  CELEXA  
TAKE 2 TABLETS BY MOUTH DAILY FOR MOOD  
  
 clonazePAM 0.5 mg tablet Commonly known as:  KlonoPIN  
TAKE 1 TABLET BY MOUTH EVERY EVENING ONLY AS NEEDED FOR ANXIETY  
  
 diclofenac 1 % Gel Commonly known as:  VOLTAREN Apply 4 g to affected area two (2) times a day. docusate sodium 100 mg Tab Take  by mouth. fexofenadine 180 mg tablet Commonly known as:  [place] Surya Take  by mouth. * fluticasone 50 mcg/actuation nasal spray Commonly known as:  Ala Lips 2 Sprays by Both Nostrils route daily. * fluticasone 50 mcg/actuation nasal spray Commonly known as:  Ala Lips INSTILL 2 SPRAYS IN EACH NOSTIRL ONCE DAILY  
  
 hydrocortisone 1 % topical cream  
Commonly known as:  CORTAID Apply 1 Each to affected area daily. inside right ear for eczema  
  
 levothyroxine 112 mcg tablet Commonly known as:  SYNTHROID Takes 5 days a week. (Not Monday and Friday)  
  
 lisinopril 2.5 mg tablet Commonly known as:  PRINIVIL, ZESTRIL  
TAKE 1 TABLET BY MOUTH DAILY FOR BLOOD PRESSURE  
  
 meloxicam 7.5 mg tablet Commonly known as:  MOBIC Take 1 Tab by mouth daily. memantine 10 mg tablet Commonly known as:  Vernadine Shall Take 1 Tab by mouth daily. metoprolol succinate 25 mg XL tablet Commonly known as:  TOPROL-XL Take 0.5 Tabs by mouth nightly. Replaced Coreg * MIRALAX 17 gram packet Generic drug:  polyethylene glycol Take 17 g by mouth. * polyethylene glycol 17 gram/dose powder Commonly known as:  [place] MIX 1/2 TO 1 CAPFUL WITH 6 TO 8 OUNCES FLUID DAILY FOR CONSTIPATION  
  
 OXYGEN-AIR DELIVERY SYSTEMS  
2 L by Nasal route nightly. PREMARIN 0.625 mg/gram vaginal cream  
Generic drug:  conjugated estrogens Insert 0.5 g into vagina two (2) times a week. on Tuesday & Friday  
  
 raNITIdine 150 mg tablet Commonly known as:  ZANTAC TAKE 1 TABLET BY MOUTH TWICE DAILY FOR STOMACH ACID NEEDS APPOINTMENT * Notice: This list has 4 medication(s) that are the same as other medications prescribed for you. Read the directions carefully, and ask your doctor or other care provider to review them with you. Prescriptions Sent to Pharmacy Refills  
 meloxicam (MOBIC) 7.5 mg tablet 11 Sig: Take 1 Tab by mouth daily. Class: Normal  
 Pharmacy: [address] [place] [address] #: [phone] Route: Oral  
  
To-Do List   
 06/07/2017 To Be Determined Appointment with Helga Bacon LPN at [place] [address]  
  
 06/07/2017 To Be Determined Appointment with Rodríguez Hunt at [place] [address]  
  
 06/12/2017 To Be Determined Appointment with Rodríguez Hunt at [place] [address]  
  
 06/14/2017 To Be Determined Appointment with Rodríguez Hunt at [place] [address]  
  
 06/15/2017 To Be Determined Appointment with Micky Gowers, RN at [place] [address] Patient Instructions Restart mobic. Use hospital bed, no side rails. Use incontinence supplies Continue home PT, personal care and respite care. Please provide this summary of care documentation to your next provider. Your primary care clinician is listed as Tory Coleman. If you have any questions after today's visit, please call [phone].

## 2017-06-06 NOTE — PATIENT INSTRUCTIONS
Restart mobic. Use hospital bed, no side rails. Use incontinence supplies    Continue home PT, personal care and respite care.

## 2017-06-06 NOTE — PROGRESS NOTES
HPI:  80 y.o.  presents for follow up appointment. Patient is severely demented and unable to provide any accurate history. All history provided by caregiver, Calvin James. Went to visit family in [place] - first night away was complicated by a fall with head laceration and then wandering outside of the home in the night. She has since returned to stay with her local caregiver. Head laceration has healed. Caregiver states she complains of knee pain frequently. She's had home health nursing and PT recently which have helped the caregiver learn some new strategies for taking care of patient. One is using incontinence briefs at all times, and another is using a hospital bed in an attempt to limit falls. Caregiver brings some written history which has been scanned into chart - and includes notice that Ms Makayla Greene will be moving to a LTC facility in [place] in the coming year. Caregiver reports no other complaints - no shortness of breath, no edema, no abdominal pain, no change in bowel or bladder habits. Past Medical History:   Diagnosis Date    Allergic rhinitis     Atrophic vaginitis     Basal cell carcinoma     Constipation     Dementia     lives with caregiver, Calvin James    Depression     Essential hypertension     GERD (gastroesophageal reflux disease)     Hypothyroidism        Social History   Substance Use Topics    Smoking status: Never Smoker    Smokeless tobacco: Never Used    Alcohol use No       Outpatient Prescriptions Marked as Taking for the 6/6/17 encounter (Office Visit) with Isaiah Thomas MD   Medication Sig Dispense Refill    meloxicam (MOBIC) 7.5 mg tablet Take 1 Tab by mouth daily. 30 Tab 11    hydrocortisone (CORTAID) 1 % topical cream Apply 1 Each to affected area daily. inside right ear for eczema      conjugated estrogens (PREMARIN) 0.625 mg/gram vaginal cream Insert 0.5 g into vagina two (2) times a week.  on Tuesday & Friday      diclofenac (VOLTAREN) 1 % gel Apply 4 g to affected area two (2) times a day.  OXYGEN-AIR DELIVERY SYSTEMS 2 L by Nasal route nightly.  clonazePAM (KLONOPIN) 0.5 mg tablet TAKE 1 TABLET BY MOUTH EVERY EVENING ONLY AS NEEDED FOR ANXIETY 90 Tab 0    lisinopril (PRINIVIL, ZESTRIL) 2.5 mg tablet TAKE 1 TABLET BY MOUTH DAILY FOR BLOOD PRESSURE 30 Tab 11    polyethylene glycol (MIRALAX) 17 gram/dose powder MIX 1/2 TO 1 CAPFUL WITH 6 TO 8 OUNCES FLUID DAILY FOR CONSTIPATION 600 g 11    fexofenadine (ALLEGRA) 180 mg tablet Take  by mouth.  fluticasone (FLONASE) 50 mcg/actuation nasal spray 2 Sprays by Both Nostrils route daily.  metoprolol succinate (TOPROL-XL) 25 mg XL tablet Take 0.5 Tabs by mouth nightly. Replaced Coreg 30 Tab 6    fluticasone (FLONASE) 50 mcg/actuation nasal spray INSTILL 2 SPRAYS IN EACH NOSTIRL ONCE DAILY 1 Bottle 11    memantine (NAMENDA) 10 mg tablet Take 1 Tab by mouth daily. 90 Tab 3    levothyroxine (SYNTHROID) 112 mcg tablet Takes 5 days a week. (Not Monday and Friday) 75 Tab 3    ranitidine (ZANTAC) 150 mg tablet TAKE 1 TABLET BY MOUTH TWICE DAILY FOR STOMACH ACID NEEDS APPOINTMENT 60 Tab 11    citalopram (CELEXA) 20 mg tablet TAKE 2 TABLETS BY MOUTH DAILY FOR MOOD 180 Tab 3    Calcium Carbonate-Vit D3-Min 600 mg (1,500 mg)-400 unit chew Take  by mouth.  docusate sodium 100 mg tab Take  by mouth.  polyethylene glycol (MIRALAX) 17 gram packet Take 17 g by mouth. Allergies   Allergen Reactions    Sulfa (Sulfonamide Antibiotics) Not Reported This Time       ROS:  ROS negative except as per HPI. PE:  Visit Vitals    /71 (BP 1 Location: Left arm, BP Patient Position: Sitting)    Pulse 61    Temp 98.1 °F (36.7 °C) (Oral)    Resp 18    Ht 5' 6\" (1.676 m)    Wt 150 lb (68 kg)    SpO2 95%    BMI 24.21 kg/m2     Gen: pleasant, quiet.  Will respond when spoken to, but not always appropriately  Head: normocephalic,  Eyes: pupils equal round reactive to light, sclera clear, conjunctiva clear  Oral: moist mucus membranes, no oral lesions, no pharyngeal inflammation or exudate  Neck: symmetric normal sized thyroid, no carotid bruits, no jugular vein distention  Resp: no increase work of breathing, lungs clear to ausculation bilaterally, no wheezing, rales or rhonchi  CV: S1, S2 normal.  No murmurs, rubs, or gallops. Abd: soft, not tender, not distended. No hepatosplenomegaly. Normal bowel sounds. Skin: no lesion or rash  Extremities: no edema, small effusions of knees, some crepitus    Assessment/Plan:    1. Dementia without behavioral disturbance, unspecified dementia type  Severe, worsening dementia. Mobility seems to be declining with recent falls and wandering. Agree with LTC placement - hopefully somewhere with a memory unit. 2. Paroxysmal atrial fibrillation (HCC)  asymptomatic    3. Systolic CHF, chronic (HCC)  Stable on current medications. 4. Arthritis of both knees  Continue mobic - benefits outweigh risks. OK to continue PT. Orders Placed This Encounter    meloxicam (MOBIC) 7.5 mg tablet     Sig: Take 1 Tab by mouth daily. Dispense:  30 Tab     Refill:  11       Current Outpatient Prescriptions   Medication Sig Dispense Refill    meloxicam (MOBIC) 7.5 mg tablet Take 1 Tab by mouth daily. 30 Tab 11    hydrocortisone (CORTAID) 1 % topical cream Apply 1 Each to affected area daily. inside right ear for eczema      conjugated estrogens (PREMARIN) 0.625 mg/gram vaginal cream Insert 0.5 g into vagina two (2) times a week. on Tuesday & Friday      diclofenac (VOLTAREN) 1 % gel Apply 4 g to affected area two (2) times a day.  OXYGEN-AIR DELIVERY SYSTEMS 2 L by Nasal route nightly.       clonazePAM (KLONOPIN) 0.5 mg tablet TAKE 1 TABLET BY MOUTH EVERY EVENING ONLY AS NEEDED FOR ANXIETY 90 Tab 0    lisinopril (PRINIVIL, ZESTRIL) 2.5 mg tablet TAKE 1 TABLET BY MOUTH DAILY FOR BLOOD PRESSURE 30 Tab 11    polyethylene glycol (MIRALAX) 17 gram/dose powder MIX 1/2 TO 1 CAPFUL WITH 6 TO 8 OUNCES FLUID DAILY FOR CONSTIPATION 600 g 11    fexofenadine (ALLEGRA) 180 mg tablet Take  by mouth.  fluticasone (FLONASE) 50 mcg/actuation nasal spray 2 Sprays by Both Nostrils route daily.  metoprolol succinate (TOPROL-XL) 25 mg XL tablet Take 0.5 Tabs by mouth nightly. Replaced Coreg 30 Tab 6    fluticasone (FLONASE) 50 mcg/actuation nasal spray INSTILL 2 SPRAYS IN EACH NOSTIRL ONCE DAILY 1 Bottle 11    memantine (NAMENDA) 10 mg tablet Take 1 Tab by mouth daily. 90 Tab 3    levothyroxine (SYNTHROID) 112 mcg tablet Takes 5 days a week. (Not Monday and Friday) 75 Tab 3    ranitidine (ZANTAC) 150 mg tablet TAKE 1 TABLET BY MOUTH TWICE DAILY FOR STOMACH ACID NEEDS APPOINTMENT 60 Tab 11    citalopram (CELEXA) 20 mg tablet TAKE 2 TABLETS BY MOUTH DAILY FOR MOOD 180 Tab 3    Calcium Carbonate-Vit D3-Min 600 mg (1,500 mg)-400 unit chew Take  by mouth.  docusate sodium 100 mg tab Take  by mouth.  polyethylene glycol (MIRALAX) 17 gram packet Take 17 g by mouth. Spent >20 minutes face to face counseling patient. Verbal and written instructions (see AVS) provided. Patient expresses understanding of diagnosis and treatment plan.

## 2017-06-07 ENCOUNTER — HOME CARE VISIT (OUTPATIENT)
Dept: SCHEDULING | Facility: HOME HEALTH | Age: 82
End: 2017-06-07
Payer: MEDICARE

## 2017-06-07 PROCEDURE — G0300 HHS/HOSPICE OF LPN EA 15 MIN: HCPCS

## 2017-06-07 PROCEDURE — G0156 HHCP-SVS OF AIDE,EA 15 MIN: HCPCS

## 2017-06-10 VITALS
DIASTOLIC BLOOD PRESSURE: 68 MMHG | OXYGEN SATURATION: 97 % | BODY MASS INDEX: 24.21 KG/M2 | HEART RATE: 67 BPM | WEIGHT: 150 LBS | TEMPERATURE: 98.5 F | RESPIRATION RATE: 17 BRPM | SYSTOLIC BLOOD PRESSURE: 130 MMHG

## 2017-06-13 ENCOUNTER — HOME CARE VISIT (OUTPATIENT)
Dept: SCHEDULING | Facility: HOME HEALTH | Age: 82
End: 2017-06-13
Payer: MEDICARE

## 2017-06-13 PROCEDURE — G0156 HHCP-SVS OF AIDE,EA 15 MIN: HCPCS

## 2017-06-14 ENCOUNTER — OFFICE VISIT (OUTPATIENT)
Dept: CARDIOLOGY CLINIC | Age: 82
End: 2017-06-14

## 2017-06-14 VITALS
BODY MASS INDEX: 24.3 KG/M2 | HEIGHT: 66 IN | WEIGHT: 151.2 LBS | OXYGEN SATURATION: 95 % | HEART RATE: 50 BPM | RESPIRATION RATE: 12 BRPM | DIASTOLIC BLOOD PRESSURE: 60 MMHG | SYSTOLIC BLOOD PRESSURE: 130 MMHG

## 2017-06-14 DIAGNOSIS — I50.22 SYSTOLIC CHF, CHRONIC (HCC): Primary | ICD-10-CM

## 2017-06-14 DIAGNOSIS — I48.0 PAROXYSMAL ATRIAL FIBRILLATION (HCC): ICD-10-CM

## 2017-06-14 DIAGNOSIS — I10 ESSENTIAL HYPERTENSION: ICD-10-CM

## 2017-06-14 NOTE — MR AVS SNAPSHOT
Visit Information Date & Time Provider Department Dept. Phone Encounter #  
 6/14/2017 10:30 AM [address], MD [facility] [phone] [id number] Follow-up Instructions Return in about 6 months (around 12/14/2017). Routing History Follow-up and Disposition History Upcoming Health Maintenance Date Due DTaP/Tdap/Td series (1 - Tdap) 2/1/1953 GLAUCOMA SCREENING Q2Y 2/1/1997 INFLUENZA AGE 9 TO ADULT 8/1/2017 MEDICARE YEARLY EXAM 3/8/2018 Allergies as of 6/14/2017  Review Complete On: 6/14/2017 By: Toni Magaña LPN Severity Noted Reaction Type Reactions Sulfa (Sulfonamide Antibiotics)  06/21/2016    Not Reported This Time Current Immunizations  Reviewed on 6/6/2016 Name Date Influenza High Dose Vaccine PF 10/3/2016 Pneumococcal Conjugate (PCV-13) 6/6/2016 Pneumococcal Polysaccharide (PPSV-23) 11/3/2014 Zoster Vaccine, Live 12/3/2012 Not reviewed this visit You Were Diagnosed With   
  
 Codes Comments Systolic CHF, chronic (HCC)    -  Primary ICD-10-CM: L36.14 ICD-9-CM: 428.22, 428.0 Paroxysmal atrial fibrillation (HCC)     ICD-10-CM: I48.0 ICD-9-CM: 427.31 Essential hypertension     ICD-10-CM: I10 
ICD-9-CM: 401.9 Vitals BP Pulse Resp Height(growth percentile) Weight(growth percentile) SpO2  
 130/60 (BP Patient Position: Sitting) (!) 50 12 5' 6\" (1.676 m) 151 lb 3.2 oz (68.6 kg) 95% BMI OB Status Smoking Status 24.4 kg/m2 Hysterectomy Never Smoker BMI and BSA Data Body Mass Index Body Surface Area  
 24.4 kg/m 2 1.79 m 2 Preferred Pharmacy Pharmacy Name Phone [facility], [address] [phone] Your Updated Medication List  
  
   
This list is accurate as of: 6/14/17 11:40 AM.  Always use your most recent med list.  
  
  
  
  
 Calcium Carbonate-Vit D3-Min 600 mg (1,500 mg)-400 unit Chew Take  by mouth.  
  
 citalopram 20 mg tablet Commonly known as:  CELEXA  
TAKE 2 TABLETS BY MOUTH DAILY FOR MOOD  
  
 clonazePAM 0.5 mg tablet Commonly known as:  KlonoPIN  
TAKE 1 TABLET BY MOUTH EVERY EVENING ONLY AS NEEDED FOR ANXIETY  
  
 diclofenac 1 % Gel Commonly known as:  VOLTAREN Apply 4 g to affected area two (2) times a day. docusate sodium 100 mg Tab Take  by mouth. fexofenadine 180 mg tablet Commonly known as:  Seleta Fabio Take  by mouth. fluticasone 50 mcg/actuation nasal spray Commonly known as:  Rajni Arechiga INSTILL 2 SPRAYS IN EACH NOSTIRL ONCE DAILY  
  
 hydrocortisone 1 % topical cream  
Commonly known as:  CORTAID Apply 1 Each to affected area daily. inside right ear for eczema  
  
 levothyroxine 112 mcg tablet Commonly known as:  SYNTHROID Takes 5 days a week. (Not Monday and Friday)  
  
 lisinopril 2.5 mg tablet Commonly known as:  PRINIVIL, ZESTRIL  
TAKE 1 TABLET BY MOUTH DAILY FOR BLOOD PRESSURE  
  
 meloxicam 7.5 mg tablet Commonly known as:  MOBIC Take 1 Tab by mouth daily. memantine 10 mg tablet Commonly known as:  Rick Raymundo Take 1 Tab by mouth daily. metoprolol succinate 25 mg XL tablet Commonly known as:  TOPROL-XL Take 0.5 Tabs by mouth nightly. Replaced Coreg MIRALAX 17 gram packet Generic drug:  polyethylene glycol Take 17 g by mouth. OXYGEN-AIR DELIVERY SYSTEMS  
2 L by Nasal route nightly. PREMARIN 0.625 mg/gram vaginal cream  
Generic drug:  conjugated estrogens Insert 0.5 g into vagina two (2) times a week. on Tuesday & Friday  
  
 raNITIdine 150 mg tablet Commonly known as:  ZANTAC TAKE 1 TABLET BY MOUTH TWICE DAILY FOR STOMACH ACID NEEDS APPOINTMENT  
  
 TYLENOL ARTHRITIS PAIN PO Take  by mouth. We Performed the Following AMB POC EKG ROUTINE W/ 12 LEADS, INTER & REP [77318 CPT(R)] Follow-up Instructions Return in about 6 months (around 12/14/2017). To-Do List   
 06/15/2017 To Be Determined Appointment with James Martinez at [facility]  
  
 06/15/2017 To Be Determined Appointment with Jeffy Stinson RN at [facility] Please provide this summary of care documentation to your next provider. Your primary care clinician is listed as Chary Otero. If you have any questions after today's visit, please call [phone].

## 2017-06-14 NOTE — PROGRESS NOTES
NAME:  Kenna Burton   :   1932   MRN:   [id number]   PCP:  Imelda Najjar, MD           Subjective: The patient is a 80y.o. year old female  who returns for a routine follow-up. Since the last visit, patient reports no change in exercise tolerance, chest pain, edema, medication intolerance, palpitations, shortness of breath, PND/orthopnea wheezing, sputum, syncope, dizziness or light headedness. Doing well. Past Medical History:   Diagnosis Date    Allergic rhinitis     Atrophic vaginitis     Basal cell carcinoma     Constipation     Dementia     lives with caregiver, Nadia Tolentino    Depression     Essential hypertension     GERD (gastroesophageal reflux disease)     Hypothyroidism         ICD-10-CM ICD-9-CM    1. Systolic CHF, chronic (HCC) I50.22 428.22      428.0    2. Paroxysmal atrial fibrillation (HCC) I48.0 427.31 ACETAMINOPHEN (TYLENOL ARTHRITIS PAIN PO)      AMB POC EKG ROUTINE W/ 12 LEADS, INTER & REP   3. Essential hypertension I10 401.9       Social History   Substance Use Topics    Smoking status: Never Smoker    Smokeless tobacco: Never Used    Alcohol use No      Family History   Problem Relation Age of Onset    Cancer Other         Review of Systems  General: Pt denies excessive weight gain or loss. Pt is able to conduct ADL's  HEENT: Denies blurred vision, headaches, epistaxis and difficulty swallowing. Respiratory: Denies shortness of breath, TONEY, wheezing or stridor. Cardiovascular: Denies precordial pain, palpitations, edema or PND  Gastrointestinal: Denies poor appetite, indigestion, abdominal pain or blood in stool  Musculoskeletal: Denies pain or swelling from muscles or joints  Neurologic: Denies tremor, paresthesias, or sensory motor disturbance  Skin: Denies rash, itching or texture change.     Objective:       Vitals:    17 1058   BP: 130/60   Pulse: (!) 50   Resp: 12   SpO2: 95%   Weight: 151 lb 3.2 oz (68.6 kg)   Height: 5' 6\" (1.676 m)    Body mass index is 24.4 kg/(m^2). General PE  Mental Status - Alert. General Appearance - Not in acute distress. Chest and Lung Exam   Inspection: Accessory muscles - No use of accessory muscles in breathing. Auscultation:   Breath sounds: - Normal.    Cardiovascular   Inspection: Jugular vein - Bilateral - Inspection Normal.  Palpation/Percussion:   Apical Impulse: - Normal.  Auscultation: Rhythm - Regular. Heart Sounds - S1 WNL and S2 WNL. No S3 or S4. Murmurs & Other Heart Sounds: Auscultation of the heart reveals - No Murmurs. Peripheral Vascular   Upper Extremity: Inspection - Bilateral - No Cyanotic nailbeds or Digital clubbing. Lower Extremity:   Palpation: Edema - Bilateral - No edema. Data Review:     EKG -EKG: normal EKG, normal sinus rhythm, unchanged from previous tracings. Medications reviewed  Current Outpatient Prescriptions   Medication Sig    ACETAMINOPHEN (TYLENOL ARTHRITIS PAIN PO) Take  by mouth.  meloxicam (MOBIC) 7.5 mg tablet Take 1 Tab by mouth daily.  hydrocortisone (CORTAID) 1 % topical cream Apply 1 Each to affected area daily. inside right ear for eczema    conjugated estrogens (PREMARIN) 0.625 mg/gram vaginal cream Insert 0.5 g into vagina two (2) times a week. on Tuesday & Friday    diclofenac (VOLTAREN) 1 % gel Apply 4 g to affected area two (2) times a day.  OXYGEN-AIR DELIVERY SYSTEMS 2 L by Nasal route nightly.  clonazePAM (KLONOPIN) 0.5 mg tablet TAKE 1 TABLET BY MOUTH EVERY EVENING ONLY AS NEEDED FOR ANXIETY    lisinopril (PRINIVIL, ZESTRIL) 2.5 mg tablet TAKE 1 TABLET BY MOUTH DAILY FOR BLOOD PRESSURE    fexofenadine (ALLEGRA) 180 mg tablet Take  by mouth.  metoprolol succinate (TOPROL-XL) 25 mg XL tablet Take 0.5 Tabs by mouth nightly. Replaced Coreg    fluticasone (FLONASE) 50 mcg/actuation nasal spray INSTILL 2 SPRAYS IN EACH NOSTIRL ONCE DAILY    memantine (NAMENDA) 10 mg tablet Take 1 Tab by mouth daily.     levothyroxine (SYNTHROID) 112 mcg tablet Takes 5 days a week. (Not Monday and Friday)    ranitidine (ZANTAC) 150 mg tablet TAKE 1 TABLET BY MOUTH TWICE DAILY FOR STOMACH ACID NEEDS APPOINTMENT    citalopram (CELEXA) 20 mg tablet TAKE 2 TABLETS BY MOUTH DAILY FOR MOOD    Calcium Carbonate-Vit D3-Min 600 mg (1,500 mg)-400 unit chew Take  by mouth.  docusate sodium 100 mg tab Take  by mouth.  polyethylene glycol (MIRALAX) 17 gram packet Take 17 g by mouth. No current facility-administered medications for this visit. Assessment:       ICD-10-CM ICD-9-CM    1. Systolic CHF, chronic (HCC) I50.22 428.22      428.0    2. Paroxysmal atrial fibrillation (HCC) I48.0 427.31 ACETAMINOPHEN (TYLENOL ARTHRITIS PAIN PO)      AMB POC EKG ROUTINE W/ 12 LEADS, INTER & REP   3. Essential hypertension I10 401.9         Plan:     Patient presents doing well and stable from cardiac standpoint. Well compensated. Last echo showed EF 35-40%. Continue same, unable to increase meds due to her symptoms. Continue current care and follow up in 6 months.     Neri Langley MD

## 2017-06-14 NOTE — PROGRESS NOTES
Patient voice no complaint had a fall a month ago( not dizzy) at a different home got up and hit her left temple area went to ED .

## 2017-06-15 ENCOUNTER — HOME CARE VISIT (OUTPATIENT)
Dept: SCHEDULING | Facility: HOME HEALTH | Age: 82
End: 2017-06-15
Payer: MEDICARE

## 2017-06-15 ENCOUNTER — CLINICAL SUPPORT (OUTPATIENT)
Dept: FAMILY MEDICINE CLINIC | Age: 82
End: 2017-06-15

## 2017-06-15 ENCOUNTER — TELEPHONE (OUTPATIENT)
Dept: FAMILY MEDICINE CLINIC | Age: 82
End: 2017-06-15

## 2017-06-15 VITALS
DIASTOLIC BLOOD PRESSURE: 80 MMHG | HEART RATE: 71 BPM | RESPIRATION RATE: 18 BRPM | SYSTOLIC BLOOD PRESSURE: 116 MMHG | OXYGEN SATURATION: 97 % | TEMPERATURE: 98.3 F

## 2017-06-15 DIAGNOSIS — R30.0 DYSURIA: Primary | ICD-10-CM

## 2017-06-15 LAB
BILIRUB UR QL STRIP: NEGATIVE
GLUCOSE UR-MCNC: NEGATIVE MG/DL
KETONES P FAST UR STRIP-MCNC: NEGATIVE MG/DL
PH UR STRIP: 7 [PH] (ref 4.6–8)
PROT UR QL STRIP: NEGATIVE MG/DL
SP GR UR STRIP: 1.01 (ref 1–1.03)
UA UROBILINOGEN AMB POC: NORMAL (ref 0.2–1)
URINALYSIS CLARITY POC: CLEAR
URINALYSIS COLOR POC: YELLOW
URINE BLOOD POC: NEGATIVE
URINE LEUKOCYTES POC: NORMAL
URINE NITRITES POC: NEGATIVE

## 2017-06-15 PROCEDURE — G0299 HHS/HOSPICE OF RN EA 15 MIN: HCPCS

## 2017-06-15 PROCEDURE — G0156 HHCP-SVS OF AIDE,EA 15 MIN: HCPCS

## 2017-06-15 NOTE — PROGRESS NOTES
Care giver brought in a urine sample and said she is having irritation in the labial area and sleeping a little more. No fever but is concerned she has a UTI again because these are usually the symptoms. Dr. Gurvinder Dueñas gave a verbal order for a UA and culture. She said with UA results she will wait on culture before she decides if she needs treatment.

## 2017-06-16 LAB — BACTERIA UR CULT: NORMAL

## 2017-06-21 ENCOUNTER — TELEPHONE (OUTPATIENT)
Dept: FAMILY MEDICINE CLINIC | Age: 82
End: 2017-06-21

## 2017-07-07 ENCOUNTER — TELEPHONE (OUTPATIENT)
Dept: CARDIOLOGY CLINIC | Age: 82
End: 2017-07-07

## 2017-07-07 NOTE — TELEPHONE ENCOUNTER
Spoke with patient's caregiver Jody Wylie(verified HIPPA)  Verified patient with 2 patient identifier   Says patient has c/o chest heaviness. Patient has dementia and is unable to verbalized symptoms. Informed caregiver I have spoken with Randy Webb NP and was informed it is  best for patient to be seen at ED for evaluation.

## 2017-07-13 ENCOUNTER — OFFICE VISIT (OUTPATIENT)
Dept: FAMILY MEDICINE CLINIC | Age: 82
End: 2017-07-13

## 2017-07-13 VITALS
RESPIRATION RATE: 12 BRPM | WEIGHT: 152 LBS | OXYGEN SATURATION: 95 % | HEIGHT: 66 IN | BODY MASS INDEX: 24.43 KG/M2 | TEMPERATURE: 98 F | DIASTOLIC BLOOD PRESSURE: 71 MMHG | HEART RATE: 59 BPM | SYSTOLIC BLOOD PRESSURE: 141 MMHG

## 2017-07-13 DIAGNOSIS — R35.0 URINARY FREQUENCY: ICD-10-CM

## 2017-07-13 DIAGNOSIS — F32.89 OTHER DEPRESSION: ICD-10-CM

## 2017-07-13 DIAGNOSIS — I50.22 SYSTOLIC CHF, CHRONIC (HCC): Primary | ICD-10-CM

## 2017-07-13 DIAGNOSIS — I10 ESSENTIAL HYPERTENSION: ICD-10-CM

## 2017-07-13 DIAGNOSIS — F03.90 DEMENTIA WITHOUT BEHAVIORAL DISTURBANCE, UNSPECIFIED DEMENTIA TYPE: ICD-10-CM

## 2017-07-13 DIAGNOSIS — R53.83 FATIGUE, UNSPECIFIED TYPE: ICD-10-CM

## 2017-07-13 LAB
BILIRUB UR QL STRIP: NEGATIVE
GLUCOSE UR-MCNC: NEGATIVE MG/DL
KETONES P FAST UR STRIP-MCNC: NEGATIVE MG/DL
PH UR STRIP: 5.5 [PH] (ref 4.6–8)
PROT UR QL STRIP: NORMAL MG/DL
SP GR UR STRIP: 1.03 (ref 1–1.03)
UA UROBILINOGEN AMB POC: NORMAL (ref 0.2–1)
URINALYSIS CLARITY POC: CLEAR
URINALYSIS COLOR POC: YELLOW
URINE BLOOD POC: NORMAL
URINE LEUKOCYTES POC: NORMAL
URINE NITRITES POC: NEGATIVE

## 2017-07-13 NOTE — PROGRESS NOTES
JANI Xiong is a 80 y.o. female who  presents to follow-up from emergency room visit to [facility]. She went on 7/7 for a heavy feeling in her chest.  Troponin was negative ×2. I reviewed the emergency room record. CBC, BMP, urinalysis all looked fine. They were reassured and went home, 3 days later she had a little nausea, vomiting, loose stool but this was a one-time event. She has also been feeling fatigued but this is a chronic complaint. The only things of note from emergency room visit were a very elevated blood pressure, no BNP (although a chest x-ray showed clear lungs)    PMHx:  Past Medical History:   Diagnosis Date    Allergic rhinitis     Atrophic vaginitis     Basal cell carcinoma     Constipation     Dementia     lives with caregiver, Kristel Mendoza    Depression     Essential hypertension     GERD (gastroesophageal reflux disease)     Hypothyroidism        Meds:   Current Outpatient Prescriptions   Medication Sig Dispense Refill    ACETAMINOPHEN (TYLENOL ARTHRITIS PAIN PO) Take  by mouth.  meloxicam (MOBIC) 7.5 mg tablet Take 1 Tab by mouth daily. 30 Tab 11    hydrocortisone (CORTAID) 1 % topical cream Apply 1 Each to affected area daily. inside right ear for eczema      conjugated estrogens (PREMARIN) 0.625 mg/gram vaginal cream Insert 0.5 g into vagina two (2) times a week. on Tuesday & Friday      diclofenac (VOLTAREN) 1 % gel Apply 4 g to affected area two (2) times a day.  OXYGEN-AIR DELIVERY SYSTEMS 2 L by Nasal route nightly.  clonazePAM (KLONOPIN) 0.5 mg tablet TAKE 1 TABLET BY MOUTH EVERY EVENING ONLY AS NEEDED FOR ANXIETY 90 Tab 0    lisinopril (PRINIVIL, ZESTRIL) 2.5 mg tablet TAKE 1 TABLET BY MOUTH DAILY FOR BLOOD PRESSURE 30 Tab 11    fexofenadine (ALLEGRA) 180 mg tablet Take  by mouth.  metoprolol succinate (TOPROL-XL) 25 mg XL tablet Take 0.5 Tabs by mouth nightly.  Replaced Coreg 30 Tab 6    fluticasone (FLONASE) 50 mcg/actuation nasal spray INSTILL 2 SPRAYS IN EACH NOSTIRL ONCE DAILY 1 Bottle 11    memantine (NAMENDA) 10 mg tablet Take 1 Tab by mouth daily. 90 Tab 3    levothyroxine (SYNTHROID) 112 mcg tablet Takes 5 days a week. (Not Monday and Friday) 75 Tab 3    ranitidine (ZANTAC) 150 mg tablet TAKE 1 TABLET BY MOUTH TWICE DAILY FOR STOMACH ACID NEEDS APPOINTMENT 60 Tab 11    citalopram (CELEXA) 20 mg tablet TAKE 2 TABLETS BY MOUTH DAILY FOR MOOD 180 Tab 3    Calcium Carbonate-Vit D3-Min 600 mg (1,500 mg)-400 unit chew Take  by mouth.  docusate sodium 100 mg tab Take  by mouth.  polyethylene glycol (MIRALAX) 17 gram packet Take 17 g by mouth. Allergies: Allergies   Allergen Reactions    Sulfa (Sulfonamide Antibiotics) Not Reported This Time       Smoker:  History   Smoking Status    Never Smoker   Smokeless Tobacco    Never Used       ETOH:   History   Alcohol Use No       FH:   Family History   Problem Relation Age of Onset    Cancer Other        ROS:   As listed in HPI. In addition:  Constitutional:   No headache, fever, fatigue, weight loss or weight gain      Cardiac:    No chest pain      Resp:   No cough or shortness of breath      Neuro   No loss of consciousness, dizziness, seizures      Physical Exam:  Blood pressure 141/71, pulse (!) 59, temperature 98 °F (36.7 °C), resp. rate 12, height 5' 6\" (1.676 m), weight 152 lb (68.9 kg), SpO2 95 %. GEN: No apparent distress. Alert and oriented and responds to all questions appropriately. NEUROLOGIC:  No focal neurologic deficits. Strength and sensation grossly intact. Coordination and gait grossly intact. EXT: Well perfused. No edema. SKIN: No obvious rashes. Cardiovascular, no murmur, no bruit  Lungs clear to auscultation bilaterally, initially some atelectasis in the bases which cleared with deep inspiration       Assessment and Plan     Unfortunately poor historian with dementia and with a history of CHF.   Has had some elevated blood pressures recently, physical exam is not very concerning for CHF but it is something that they did not check in the emergency room so check a BNP today. Blood pressure did come down on recheck. Also check urinalysis for the sake of completeness. Looking for things that we can reverse. If nothing is evident, daughter-in-law concedes that this might be part of an overall gradual decline. Has also been having some knee pain recently, they were putting off physical therapy until the extended family went out of town. Should be okay to do this starting next week      ICD-10-CM ICD-9-CM    1. Systolic CHF, chronic (HCC) I50.22 428.22 BNP     428.0    2. Essential hypertension I10 401.9    3. Other depression F32.89 311    4. Dementia without behavioral disturbance, unspecified dementia type F03.90 294.20    5. Fatigue, unspecified type R53.83 780.79    6. Urinary frequency R35.0 788.41 AMB POC URINALYSIS DIP STICK AUTO W/O MICRO       AVS given.  Pt expressed understanding of instructions

## 2017-07-14 ENCOUNTER — TELEPHONE (OUTPATIENT)
Dept: FAMILY MEDICINE CLINIC | Age: 82
End: 2017-07-14

## 2017-07-14 LAB
BACTERIA UR CULT: NORMAL
BNP SERPL-MCNC: 190 PG/ML (ref 0–100)

## 2017-07-14 NOTE — TELEPHONE ENCOUNTER
Please let the daughter know, I checked patient's BNP to see if there were signs that her heart was failing. This test was negative.   This is reassuring

## 2017-07-14 NOTE — TELEPHONE ENCOUNTER
Spoke to caregiver Kateryna Alarcon, given message from Dr Lary Spivey about BNP,verbalized understanding.

## 2017-07-17 RX ORDER — CLONAZEPAM 0.5 MG/1
TABLET ORAL
Qty: 90 TAB | Refills: 0 | Status: SHIPPED | OUTPATIENT
Start: 2017-07-17 | End: 2017-08-15 | Stop reason: ALTCHOICE

## 2017-07-24 ENCOUNTER — TELEPHONE (OUTPATIENT)
Dept: FAMILY MEDICINE CLINIC | Age: 82
End: 2017-07-24

## 2017-07-24 RX ORDER — CITALOPRAM 20 MG/1
TABLET, FILM COATED ORAL
Qty: 180 TAB | Refills: 3 | Status: SHIPPED | OUTPATIENT
Start: 2017-07-24 | End: 2017-08-15 | Stop reason: ALTCHOICE

## 2017-08-15 ENCOUNTER — TELEPHONE (OUTPATIENT)
Dept: FAMILY MEDICINE CLINIC | Age: 82
End: 2017-08-15

## 2017-08-15 ENCOUNTER — OFFICE VISIT (OUTPATIENT)
Dept: FAMILY MEDICINE CLINIC | Age: 82
End: 2017-08-15

## 2017-08-15 VITALS
RESPIRATION RATE: 17 BRPM | OXYGEN SATURATION: 94 % | BODY MASS INDEX: 23.78 KG/M2 | WEIGHT: 148 LBS | HEART RATE: 63 BPM | HEIGHT: 66 IN | DIASTOLIC BLOOD PRESSURE: 76 MMHG | TEMPERATURE: 97.9 F | SYSTOLIC BLOOD PRESSURE: 149 MMHG

## 2017-08-15 DIAGNOSIS — I10 ESSENTIAL HYPERTENSION: ICD-10-CM

## 2017-08-15 DIAGNOSIS — I48.0 PAROXYSMAL ATRIAL FIBRILLATION (HCC): ICD-10-CM

## 2017-08-15 DIAGNOSIS — R09.02 HYPOXIA: Primary | ICD-10-CM

## 2017-08-15 DIAGNOSIS — F03.90 DEMENTIA WITHOUT BEHAVIORAL DISTURBANCE, UNSPECIFIED DEMENTIA TYPE: Primary | ICD-10-CM

## 2017-08-15 RX ORDER — SERTRALINE HYDROCHLORIDE 25 MG/1
25 TABLET, FILM COATED ORAL DAILY
Qty: 30 TAB | Refills: 0 | Status: SHIPPED | OUTPATIENT
Start: 2017-08-15 | End: 2017-09-05 | Stop reason: SDUPTHER

## 2017-08-15 RX ORDER — HALOPERIDOL 0.5 MG/1
0.25 TABLET ORAL
Qty: 30 TAB | Refills: 0 | Status: SHIPPED | OUTPATIENT
Start: 2017-08-15 | End: 2017-09-18 | Stop reason: SDUPTHER

## 2017-08-15 NOTE — PATIENT INSTRUCTIONS
Dementia: Care Instructions  Your Care Instructions  Dementia is a loss of mental skills that affects your daily life. It is different than the occasional trouble with memory that is part of aging. You may find it hard to remember things that you feel you should be able to remember. Or you may feel that your mind is just not working as well as usual.  Finding out that you have dementia is a shock. You may be afraid and worried about how the condition will change your life. Although there is no cure at this time, medicine may slow memory loss and improve thinking for a while. Other medicines may be able to help you sleep or cope with depression and behavior changes. Dementia often gets worse slowly. But it can get worse quickly. As dementia gets worse, it may become harder to do common things that take planning, like making a list and going shopping. Over time, the disease may make it hard for you to take care of yourself. Some people with dementia need others to help care for them. Dementia is different for everyone. You may be able to function well for a long time. In the early stage of the condition, you can do things at home to make life easier and safer. You also can keep doing your hobbies and other activities. Many people find comfort in planning now for their future needs. Follow-up care is a key part of your treatment and safety. Be sure to make and go to all appointments, and call your doctor if you are having problems. Its also a good idea to know your test results and keep a list of the medicines you take. How can you care for yourself at home? · Take your medicines exactly as prescribed. Call your doctor if you think you are having a problem with your medicine. · Eat healthy foods. Eat lots of whole grains, fruits, and vegetables every day.  If you are not hungry, try snacks or nutritional drinks such as Boost, Ensure, or Sustacal.  · If you have problems sleeping:  ¨ Try not to nap too close to your bedtime. ¨ Exercise regularly. Walking is a good choice. ¨ Try a glass of warm milk or caffeine-free herbal tea before bed. · Do tasks and activities during the time of day when you feel your best. It may help to develop a daily routine. · Post labels, lists, and sticky notes to help you remember things. Write your activities on a calendar you can easily find. Put your clock where you can easily see it. · Stay active. Take walks in familiar places, or with friends or loved ones. Try to stay active mentally too. Read and work crossword puzzles if you enjoy these activities. · Do not drive unless you can pass an on-road driving test. If you are not sure if you are safe to drive, your state s license bureau can test you. · Keep a cordless phone and a flashlight with new batteries by your bed. If possible, put a phone in each of the main rooms of your house, or carry a cell phone in case you fall and cannot reach a phone. Or, you can wear a device around your neck or wrist. You push a button that sends a signal for help. Acknowledge your emotions and plan for the future  · Talk openly and honestly with your doctor. · Let yourself grieve. It is common to feel angry, scared, frustrated, anxious, or depressed. · Get emotional support from family, friends, a support group, or a counselor experienced in working with people who have dementia. · Ask for help if you need it. · Plan for the future. ¨ Talk to your family and doctor about preparing a living will and other important papers while you can make decisions. These papers tell your doctors how to care for you at the end of your life. ¨ Consider naming a person to make decisions about your care if you are not able to. When should you call for help? Call 911 anytime you think you may need emergency care. For example, call if:  · You are lost and do not know whom to call. · You are injured and do not know whom to call.   Call your doctor now or seek immediate medical care if:  · You are more confused or upset than usual.  · You feel like you could hurt yourself because your mind is not working well. Watch closely for changes in your health, and be sure to contact your doctor if you have any problems. Where can you learn more? Go to http://rosy-sancho.info/. Enter B948 in the search box to learn more about \"Dementia: Care Instructions. \"  Current as of: July 26, 2016  Content Version: 11.3  © 8917-5305 [employer]. Care instructions adapted under license by [employer] (which disclaims liability or warranty for this information). If you have questions about a medical condition or this instruction, always ask your healthcare professional. [address] any warranty or liability for your use of this information.

## 2017-08-15 NOTE — PROGRESS NOTES
79yo with severe dementia and recent falls. All history is obtained from caregiver. Last night was incontinent of urine in the middle of the night because she could not make it to the bathroom. Second fall in 30 days. Recently night time hallucinations have been worsening. Caregiver has been monitoring night time behavior and disorientation has been frightening her. She states patient has gotten up in the night and knocked over the nightstand, rearrange things in her room, and stumbled on the way to the bathroom. Patient does not recall any behaviors. She speaks very little now during office visits. Per caregiver she is disoriented most of the time. She requires assistance with feeding, she requires assistance with toileting, she is unable to dress herself. In the daytime, she seems steady on her feet. In the daytime, she does not seem agitated. However at nighttime, sometimes she gets agitated and does not recognize her caregiver. This reason caregiver had tried as needed clonazepam, but notes this seems to make the disorientation and agitation a little bit worse. Past Medical History, Surgical History, and Social History reviewed. .Medication list reviewed. ROS negative mostly unobtainable, the patient denies pain on several occasions. Visit Vitals    /76 (BP 1 Location: Left arm, BP Patient Position: Sitting)    Pulse 63    Temp 97.9 °F (36.6 °C) (Oral)    Resp 17    Ht 5' 6\" (1.676 m)    Wt 148 lb (67.1 kg)    SpO2 94%    BMI 23.89 kg/m2     Gen: Awake, disoriented, confused  HEENT: Clear conjunctiva and clear sclera, PERRL, EOMI, moist mucous membranes. EACs and TMs bilaterally normal.  Lungs: no increased respiratory effort, clear to ausculation bilaterally  Heart: regular rate and rhythm, no murmurs, rubs or gallops  Abdomen: soft, nontender, not distended. Normal bowel sounds. No rebound or guarding. No hepatosplenomegaly.   Skin: no lesion or rash  Extremities:no edema or cyanosis    Assessment/Plan:    1. Dementia without behavioral disturbance, unspecified dementia type  Check labs for underlying infection or metabolic derangement that may contribute to delirium. However, patient's symptoms seem more consistent with worsening dementia. Switch from benzodiazepine to Haldol in the evenings. Switch citalopram to sertraline to avoid risk of QT prolongation. Patient uses overnight oxygen for an unexplained reason, she came to the caregiver with this order. We will get overnight oximetry to see if it is necessary as the oxygen tubing presents a fall risk. 2. Essential hypertension  At goal.  Continue current medications.   - METABOLIC PANEL, COMPREHENSIVE  - CBC WITH AUTOMATED DIFF  - TSH 3RD GENERATION    3. Paroxysmal atrial fibrillation (HCC)  No anticoagulation due to recent falls. Medications Discontinued During This Encounter   Medication Reason    clonazePAM (KLONOPIN) 0.5 mg tablet Alternate Therapy    citalopram (CELEXA) 20 mg tablet Alternate Therapy       Orders Placed This Encounter    METABOLIC PANEL, COMPREHENSIVE    CBC WITH AUTOMATED DIFF    TSH 3RD GENERATION    haloperidol (HALDOL) 0.5 mg tablet     Sig: Take 0.5 Tabs by mouth nightly. Dispense:  30 Tab     Refill:  0    sertraline (ZOLOFT) 25 mg tablet     Sig: Take 1 Tab by mouth daily. Dispense:  30 Tab     Refill:  0       Current Outpatient Prescriptions   Medication Sig Dispense Refill    haloperidol (HALDOL) 0.5 mg tablet Take 0.5 Tabs by mouth nightly. 30 Tab 0    sertraline (ZOLOFT) 25 mg tablet Take 1 Tab by mouth daily. 30 Tab 0    ACETAMINOPHEN (TYLENOL ARTHRITIS PAIN PO) Take  by mouth.  meloxicam (MOBIC) 7.5 mg tablet Take 1 Tab by mouth daily. 30 Tab 11    hydrocortisone (CORTAID) 1 % topical cream Apply 1 Each to affected area daily.  inside right ear for eczema      conjugated estrogens (PREMARIN) 0.625 mg/gram vaginal cream Insert 0.5 g into vagina two (2) times a week. on Tuesday & Friday      diclofenac (VOLTAREN) 1 % gel Apply 4 g to affected area two (2) times a day.  OXYGEN-AIR DELIVERY SYSTEMS 2 L by Nasal route nightly.  lisinopril (PRINIVIL, ZESTRIL) 2.5 mg tablet TAKE 1 TABLET BY MOUTH DAILY FOR BLOOD PRESSURE 30 Tab 11    fexofenadine (ALLEGRA) 180 mg tablet Take  by mouth.  metoprolol succinate (TOPROL-XL) 25 mg XL tablet Take 0.5 Tabs by mouth nightly. Replaced Coreg 30 Tab 6    fluticasone (FLONASE) 50 mcg/actuation nasal spray INSTILL 2 SPRAYS IN EACH NOSTIRL ONCE DAILY 1 Bottle 11    memantine (NAMENDA) 10 mg tablet Take 1 Tab by mouth daily. 90 Tab 3    levothyroxine (SYNTHROID) 112 mcg tablet Takes 5 days a week. (Not Monday and Friday) 75 Tab 3    ranitidine (ZANTAC) 150 mg tablet TAKE 1 TABLET BY MOUTH TWICE DAILY FOR STOMACH ACID NEEDS APPOINTMENT 60 Tab 11    Calcium Carbonate-Vit D3-Min 600 mg (1,500 mg)-400 unit chew Take  by mouth.  docusate sodium 100 mg tab Take  by mouth.  polyethylene glycol (MIRALAX) 17 gram packet Take 17 g by mouth. Verbal and written instructions (see AVS) provided. Patient expresses understanding of diagnosis and treatment plan.

## 2017-08-15 NOTE — TELEPHONE ENCOUNTER
Per Dr. Anthony Szymanski, Mrs. Juli Cates notified that Ms. Chavez is on such a low dose of Clonazepam that she can just stop taking it. However she was using it as a sleep aid. Mrs. Juli Cates still has some concerns about her behavior that she would like to address with Dr. Anthony Szymanski. Advised her to keep the appt this afternoon with Dr. Anthony Szymanski to discuss concerns.

## 2017-08-15 NOTE — PROGRESS NOTES
Chief Complaint   Patient presents with    Medication Evaluation     follow-up     Health Maintenance reviewed

## 2017-08-15 NOTE — MR AVS SNAPSHOT
Visit Information Date & Time Provider Department Dept. Phone Encounter #  
 8/15/2017  1:30 PM Phi Agosto Vanessa [facility] [phone] [id number] Follow-up Instructions Routing History Upcoming Health Maintenance Date Due DTaP/Tdap/Td series (1 - Tdap) 2/1/1953 GLAUCOMA SCREENING Q2Y 2/1/1997 INFLUENZA AGE 9 TO ADULT 8/1/2017 MEDICARE YEARLY EXAM 3/8/2018 Allergies as of 8/15/2017  Review Complete On: 8/15/2017 By: Phi Agosto MD  
  
 Severity Noted Reaction Type Reactions Sulfa (Sulfonamide Antibiotics)  06/21/2016    Not Reported This Time Current Immunizations  Reviewed on 6/6/2016 Name Date Influenza High Dose Vaccine PF 10/3/2016 Pneumococcal Conjugate (PCV-13) 6/6/2016 Pneumococcal Polysaccharide (PPSV-23) 11/3/2014 Zoster Vaccine, Live 12/3/2012 Not reviewed this visit You Were Diagnosed With   
  
 Codes Comments Dementia without behavioral disturbance, unspecified dementia type    -  Primary ICD-10-CM: F03.90 ICD-9-CM: 294.20 Essential hypertension     ICD-10-CM: I10 
ICD-9-CM: 401.9 Paroxysmal atrial fibrillation (HCC)     ICD-10-CM: I48.0 ICD-9-CM: 427.31 Vitals BP Pulse Temp Resp Height(growth percentile) Weight(growth percentile) 149/76 (BP 1 Location: Left arm, BP Patient Position: Sitting) 63 97.9 °F (36.6 °C) (Oral) 17 5' 6\" (1.676 m) 148 lb (67.1 kg) SpO2 BMI OB Status Smoking Status 94% 23.89 kg/m2 Hysterectomy Never Smoker Vitals History BMI and BSA Data Body Mass Index Body Surface Area  
 23.89 kg/m 2 1.77 m 2 Preferred Pharmacy Pharmacy Name Phone [facility] [address] [phone] Your Updated Medication List  
  
   
This list is accurate as of: 8/15/17  2:27 PM.  Always use your most recent med list.  
  
  
  
  
 Calcium Carbonate-Vit D3-Min 600 mg (1,500 mg)-400 unit Chew Take  by mouth. diclofenac 1 % Gel Commonly known as:  VOLTAREN Apply 4 g to affected area two (2) times a day. docusate sodium 100 mg Tab Take  by mouth. fexofenadine 180 mg tablet Commonly known as:  Ela Mare Take  by mouth. fluticasone 50 mcg/actuation nasal spray Commonly known as:  [facility] INSTILL 2 SPRAYS IN EACH NOSTIRL ONCE DAILY  
  
 haloperidol 0.5 mg tablet Commonly known as:  HALDOL Take 0.5 Tabs by mouth nightly. hydrocortisone 1 % topical cream  
Commonly known as:  CORTAID Apply 1 Each to affected area daily. inside right ear for eczema  
  
 levothyroxine 112 mcg tablet Commonly known as:  SYNTHROID Takes 5 days a week. (Not Monday and Friday)  
  
 lisinopril 2.5 mg tablet Commonly known as:  PRINIVIL, ZESTRIL  
TAKE 1 TABLET BY MOUTH DAILY FOR BLOOD PRESSURE  
  
 meloxicam 7.5 mg tablet Commonly known as:  MOBIC Take 1 Tab by mouth daily. memantine 10 mg tablet Commonly known as:  Mariama Croft Take 1 Tab by mouth daily. metoprolol succinate 25 mg XL tablet Commonly known as:  TOPROL-XL Take 0.5 Tabs by mouth nightly. Replaced Coreg MIRALAX 17 gram packet Generic drug:  polyethylene glycol Take 17 g by mouth. OXYGEN-AIR DELIVERY SYSTEMS  
2 L by Nasal route nightly. PREMARIN 0.625 mg/gram vaginal cream  
Generic drug:  conjugated estrogens Insert 0.5 g into vagina two (2) times a week. on Tuesday & Friday  
  
 raNITIdine 150 mg tablet Commonly known as:  ZANTAC TAKE 1 TABLET BY MOUTH TWICE DAILY FOR STOMACH ACID NEEDS APPOINTMENT  
  
 sertraline 25 mg tablet Commonly known as:  ZOLOFT Take 1 Tab by mouth daily. TYLENOL ARTHRITIS PAIN PO Take  by mouth. Prescriptions Printed Refills  
 haloperidol (HALDOL) 0.5 mg tablet 0 Sig: Take 0.5 Tabs by mouth nightly. Class: Print  Route: Oral  
 sertraline (ZOLOFT) 25 mg tablet 0 Sig: Take 1 Tab by mouth daily. Class: Print Route: Oral  
  
We Performed the Following CBC WITH AUTOMATED DIFF [18616 CPT(R)] METABOLIC PANEL, COMPREHENSIVE [24690 CPT(R)] TSH 3RD GENERATION [59378 CPT(R)] Patient Instructions Dementia: Care Instructions Your Care Instructions Dementia is a loss of mental skills that affects your daily life. It is different than the occasional trouble with memory that is part of aging. You may find it hard to remember things that you feel you should be able to remember. Or you may feel that your mind is just not working as well as usual. 
Finding out that you have dementia is a shock. You may be afraid and worried about how the condition will change your life. Although there is no cure at this time, medicine may slow memory loss and improve thinking for a while. Other medicines may be able to help you sleep or cope with depression and behavior changes. Dementia often gets worse slowly. But it can get worse quickly. As dementia gets worse, it may become harder to do common things that take planning, like making a list and going shopping. Over time, the disease may make it hard for you to take care of yourself. Some people with dementia need others to help care for them. Dementia is different for everyone. You may be able to function well for a long time. In the early stage of the condition, you can do things at home to make life easier and safer. You also can keep doing your hobbies and other activities. Many people find comfort in planning now for their future needs. Follow-up care is a key part of your treatment and safety. Be sure to make and go to all appointments, and call your doctor if you are having problems. Its also a good idea to know your test results and keep a list of the medicines you take. How can you care for yourself at home? · Take your medicines exactly as prescribed. Call your doctor if you think you are having a problem with your medicine. · Eat healthy foods. Eat lots of whole grains, fruits, and vegetables every day. If you are not hungry, try snacks or nutritional drinks such as Boost, Ensure, or Sustacal. 
· If you have problems sleeping: ¨ Try not to nap too close to your bedtime. ¨ Exercise regularly. Walking is a good choice. ¨ Try a glass of warm milk or caffeine-free herbal tea before bed. · Do tasks and activities during the time of day when you feel your best. It may help to develop a daily routine. · Post labels, lists, and sticky notes to help you remember things. Write your activities on a calendar you can easily find. Put your clock where you can easily see it. · Stay active. Take walks in familiar places, or with friends or loved ones. Try to stay active mentally too. Read and work crossword puzzles if you enjoy these activities. · Do not drive unless you can pass an on-road driving test. If you are not sure if you are safe to drive, your state s license bureau can test you. · Keep a cordless phone and a flashlight with new batteries by your bed. If possible, put a phone in each of the main rooms of your house, or carry a cell phone in case you fall and cannot reach a phone. Or, you can wear a device around your neck or wrist. You push a button that sends a signal for help. Acknowledge your emotions and plan for the future · Talk openly and honestly with your doctor. · Let yourself grieve. It is common to feel angry, scared, frustrated, anxious, or depressed. · Get emotional support from family, friends, a support group, or a counselor experienced in working with people who have dementia. · Ask for help if you need it. · Plan for the future. ¨ Talk to your family and doctor about preparing a living will and other important papers while you can make decisions.  These papers tell your doctors how to care for you at the end of your life. ¨ Consider naming a person to make decisions about your care if you are not able to. When should you call for help? Call 911 anytime you think you may need emergency care. For example, call if: 
· You are lost and do not know whom to call. · You are injured and do not know whom to call. Call your doctor now or seek immediate medical care if: 
· You are more confused or upset than usual. 
· You feel like you could hurt yourself because your mind is not working well. Watch closely for changes in your health, and be sure to contact your doctor if you have any problems. Where can you learn more? Go to http://rosy-sancho.info/. Enter V799 in the search box to learn more about \"Dementia: Care Instructions. \" Current as of: July 26, 2016 Content Version: 11.3 © 8925-4843 [employer]. Care instructions adapted under license by [employer] (which disclaims liability or warranty for this information). If you have questions about a medical condition or this instruction, always ask your healthcare professional. [place] [address] any warranty or liability for your use of this information. Introducing [facility]! Dear Rosa Ernst: Thank you for requesting a Wire account. Our records indicate that you have previously registered for a Wire account but its currently inactive. Please call our Wire support line at [phone]. Additional Information If you have questions, please visit the Frequently Asked Questions section of the Wire website at [email or web address] Remember, Wire is NOT to be used for urgent needs. For medical emergencies, dial 911. Now available from your iPhone and Android! Please provide this summary of care documentation to your next provider. Your primary care clinician is listed as Shiv Leone.  If you have any questions after today's visit, please call [phone].

## 2017-08-16 LAB
ALBUMIN SERPL-MCNC: 4.2 G/DL (ref 3.5–4.7)
ALBUMIN/GLOB SERPL: 1.6 {RATIO} (ref 1.2–2.2)
ALP SERPL-CCNC: 70 IU/L (ref 39–117)
ALT SERPL-CCNC: 11 IU/L (ref 0–32)
AST SERPL-CCNC: 17 IU/L (ref 0–40)
BASOPHILS # BLD AUTO: 0 X10E3/UL (ref 0–0.2)
BASOPHILS NFR BLD AUTO: 0 %
BILIRUB SERPL-MCNC: 0.6 MG/DL (ref 0–1.2)
BUN SERPL-MCNC: 24 MG/DL (ref 8–27)
BUN/CREAT SERPL: 29 (ref 12–28)
CALCIUM SERPL-MCNC: 9.4 MG/DL (ref 8.7–10.3)
CHLORIDE SERPL-SCNC: 100 MMOL/L (ref 96–106)
CO2 SERPL-SCNC: 27 MMOL/L (ref 18–29)
CREAT SERPL-MCNC: 0.83 MG/DL (ref 0.57–1)
EOSINOPHIL # BLD AUTO: 0.1 X10E3/UL (ref 0–0.4)
EOSINOPHIL NFR BLD AUTO: 1 %
ERYTHROCYTE [DISTWIDTH] IN BLOOD BY AUTOMATED COUNT: 13.6 % (ref 12.3–15.4)
GLOBULIN SER CALC-MCNC: 2.7 G/DL (ref 1.5–4.5)
GLUCOSE SERPL-MCNC: 98 MG/DL (ref 65–99)
HCT VFR BLD AUTO: 41.5 % (ref 34–46.6)
HGB BLD-MCNC: 13.5 G/DL (ref 11.1–15.9)
IMM GRANULOCYTES # BLD: 0 X10E3/UL (ref 0–0.1)
IMM GRANULOCYTES NFR BLD: 0 %
LYMPHOCYTES # BLD AUTO: 1.7 X10E3/UL (ref 0.7–3.1)
LYMPHOCYTES NFR BLD AUTO: 33 %
MCH RBC QN AUTO: 29.7 PG (ref 26.6–33)
MCHC RBC AUTO-ENTMCNC: 32.5 G/DL (ref 31.5–35.7)
MCV RBC AUTO: 91 FL (ref 79–97)
MONOCYTES # BLD AUTO: 0.3 X10E3/UL (ref 0.1–0.9)
MONOCYTES NFR BLD AUTO: 7 %
NEUTROPHILS # BLD AUTO: 2.9 X10E3/UL (ref 1.4–7)
NEUTROPHILS NFR BLD AUTO: 59 %
PLATELET # BLD AUTO: 156 X10E3/UL (ref 150–379)
POTASSIUM SERPL-SCNC: 3.9 MMOL/L (ref 3.5–5.2)
PROT SERPL-MCNC: 6.9 G/DL (ref 6–8.5)
RBC # BLD AUTO: 4.55 X10E6/UL (ref 3.77–5.28)
SODIUM SERPL-SCNC: 143 MMOL/L (ref 134–144)
TSH SERPL DL<=0.005 MIU/L-ACNC: 1.48 UIU/ML (ref 0.45–4.5)
WBC # BLD AUTO: 5 X10E3/UL (ref 3.4–10.8)

## 2017-08-17 NOTE — TELEPHONE ENCOUNTER
Spoke with Paula Miller. Will fax Overnight Oximetry order to [phone]. James notified of this and Paula Miller will contact her to make arrangement to have testing done. Jody verbalized understanding.

## 2017-08-28 ENCOUNTER — TELEPHONE (OUTPATIENT)
Dept: FAMILY MEDICINE CLINIC | Age: 82
End: 2017-08-28

## 2017-08-28 NOTE — TELEPHONE ENCOUNTER
Let her know to increase Haldol to a full tablet and this make help her get some rest Deonna Montgomery says she isn't sleeping at all and is hallucinating she has also fallen. Dr. Enedelia Da Silva is aware and said she needs 24-7 care. Which I explained to Deonna Montgomery. I spent about 20 min on the phone with her.

## 2017-08-28 NOTE — TELEPHONE ENCOUNTER
Kar Barth calling to speak to Shraddha Clement about pt's \"psychotic behavior\" that she thinks is stemmed from new med.

## 2017-08-31 ENCOUNTER — TELEPHONE (OUTPATIENT)
Dept: FAMILY MEDICINE CLINIC | Age: 82
End: 2017-08-31

## 2017-09-18 ENCOUNTER — CLINICAL SUPPORT (OUTPATIENT)
Dept: FAMILY MEDICINE CLINIC | Age: 82
End: 2017-09-18

## 2017-09-18 VITALS
SYSTOLIC BLOOD PRESSURE: 138 MMHG | BODY MASS INDEX: 24.11 KG/M2 | HEART RATE: 65 BPM | DIASTOLIC BLOOD PRESSURE: 71 MMHG | HEIGHT: 66 IN | OXYGEN SATURATION: 94 % | RESPIRATION RATE: 18 BRPM | WEIGHT: 150 LBS | TEMPERATURE: 97.8 F

## 2017-09-18 DIAGNOSIS — Z11.1 PPD SCREENING TEST: Primary | ICD-10-CM

## 2017-09-18 RX ORDER — HALOPERIDOL 0.5 MG/1
0.25 TABLET ORAL
Qty: 90 TAB | Refills: 0 | Status: SHIPPED | OUTPATIENT
Start: 2017-09-18 | End: 2017-09-20 | Stop reason: SDUPTHER

## 2017-09-20 ENCOUNTER — TELEPHONE (OUTPATIENT)
Dept: FAMILY MEDICINE CLINIC | Age: 82
End: 2017-09-20

## 2017-09-20 ENCOUNTER — OFFICE VISIT (OUTPATIENT)
Dept: FAMILY MEDICINE CLINIC | Age: 82
End: 2017-09-20

## 2017-09-20 VITALS
HEART RATE: 56 BPM | DIASTOLIC BLOOD PRESSURE: 84 MMHG | OXYGEN SATURATION: 94 % | WEIGHT: 149 LBS | BODY MASS INDEX: 23.95 KG/M2 | HEIGHT: 66 IN | RESPIRATION RATE: 19 BRPM | SYSTOLIC BLOOD PRESSURE: 136 MMHG | TEMPERATURE: 98.6 F

## 2017-09-20 DIAGNOSIS — F03.91 DEMENTIA WITH BEHAVIORAL DISTURBANCE, UNSPECIFIED DEMENTIA TYPE: ICD-10-CM

## 2017-09-20 DIAGNOSIS — R29.6 RECURRENT FALLS: ICD-10-CM

## 2017-09-20 DIAGNOSIS — Z23 ENCOUNTER FOR IMMUNIZATION: ICD-10-CM

## 2017-09-20 DIAGNOSIS — K21.9 GASTROESOPHAGEAL REFLUX DISEASE WITHOUT ESOPHAGITIS: ICD-10-CM

## 2017-09-20 DIAGNOSIS — I50.22 SYSTOLIC CHF, CHRONIC (HCC): ICD-10-CM

## 2017-09-20 DIAGNOSIS — I48.0 PAROXYSMAL ATRIAL FIBRILLATION (HCC): ICD-10-CM

## 2017-09-20 LAB
MM INDURATION POC: 0 MM (ref 0–5)
PPD POC: NEGATIVE NEGATIVE

## 2017-09-20 RX ORDER — METOPROLOL SUCCINATE 25 MG/1
12.5 TABLET, EXTENDED RELEASE ORAL
Qty: 30 TAB | Refills: 6 | Status: SHIPPED | OUTPATIENT
Start: 2017-09-20

## 2017-09-20 RX ORDER — CONJUGATED ESTROGENS 0.62 MG/G
CREAM VAGINAL
Qty: 30 G | Refills: 5 | Status: SHIPPED | OUTPATIENT
Start: 2017-09-20

## 2017-09-20 RX ORDER — HALOPERIDOL 1 MG/1
1 TABLET ORAL
Qty: 90 TAB | Refills: 1 | Status: SHIPPED | OUTPATIENT
Start: 2017-09-20 | End: 2017-09-25 | Stop reason: DRUGHIGH

## 2017-09-20 NOTE — PROGRESS NOTES
Subjective:     Chief Complaint   Patient presents with    PPD Reading     patient needs ppd reading    Immunization/Injection     flu vaccine    Confidential     patient is here to see [facility] for assistant living        She  is a 80 y.o. female who presents(history provided by long time care taker Jody) to have forms filled out for assistant living admission and medication follow up. Family is moving her to Assistant Living in [address] [place] [address] in a couple weeks. She was here for nurse visit on Monday for PPD test, negative reading today. Dementia  Caretaker provides all of history. Patient rarely talks, answers yes or no questions but does not converse. Caretaker says she tells her when she needs to go to the restroom. She was changed from celexa to sertaline on 8/15 secondary to some potential interactions with heart medication (risk for QT prolongation). Per caretaker, she potentially seems less perky but seems to be doing ok. There is some questionable complaints of headaches on and off, care taker is unsure HA are related to the Zoloft. Clonazepam d/c in August due to complaints of hallucinations and falls and changed to Haldol 0.5mg QHS. Then on 8/28/17, increased haldol dose from 0.25mg to 0.5mg QHS (corrected after caretaker clarified dose). Caretaker says that she has been doing well on this dose, no nighttime hallucinations and more stable on feet than when she was on clonazepam. She has not had any additional falls, sleeping ok. She is eating and drinking ok. She speaks very little, only yes and no. Per caregiver she is disoriented most of the time. She requires assistance with feeding, she requires assistance with toileting and dressing. In the daytime, she seems steady on her feet and she does not seem agitated. She was having night time agitation that has improved with haldol. GERD  Taking Zantac BID.   Complains of some feelings that she has food or pills \"getting stuck\" but no choking, just complains about it. She tends to have issues with the larger pills. Caretaker has not been crushing pills or giving pills with food such as applesauce. No vomiting. No coughing or shortness of breath or wheezing. Some dry coughing at night intermittently. Sleeps on one pillow. Wears oxygen at night, \"for years\" and  recently had overnight oximetry testing done on 9/1/17 but no results yet. Unsure if oxygen is needed per caretaker, she came to the caregiver with this order and she is unsure why, she does not appear to be short of breath at night. 2.5 liters nasal cannula. CHF/AFib  Taking 25 mg metoprolol daily. No SOB/TONEY. No swelling in ankles   No anticoagulation due to recent falls. Recently discharged from PT for knee   Uses walker. ROS  Gen - no fever/chills  Resp - no dyspnea or cough  CV - no chest pain or TONEY  Rest per HPI    Past Medical History:   Diagnosis Date    Allergic rhinitis     Atrophic vaginitis     Basal cell carcinoma     Constipation     Dementia     lives with caregiver, Komal Kauffman    Depression     Essential hypertension     GERD (gastroesophageal reflux disease)     Hypothyroidism      Past Surgical History:   Procedure Laterality Date    HX CHOLECYSTECTOMY      HX HYSTERECTOMY      HX TONSIL AND ADENOIDECTOMY       Current Outpatient Prescriptions on File Prior to Visit   Medication Sig Dispense Refill    sertraline (ZOLOFT) 25 mg tablet TAKE 1 TABLET BY MOUTH DAILY FOR MOOD 90 Tab 3    meloxicam (MOBIC) 7.5 mg tablet Take 1 Tab by mouth daily. 30 Tab 11    hydrocortisone (CORTAID) 1 % topical cream Apply 1 Each to affected area daily. inside right ear for eczema      conjugated estrogens (PREMARIN) 0.625 mg/gram vaginal cream Insert 0.5 g into vagina two (2) times a week. on Tuesday & Friday      diclofenac (VOLTAREN) 1 % gel Apply 4 g to affected area two (2) times a day.       OXYGEN-AIR DELIVERY SYSTEMS 2 L by Nasal route nightly.  lisinopril (PRINIVIL, ZESTRIL) 2.5 mg tablet TAKE 1 TABLET BY MOUTH DAILY FOR BLOOD PRESSURE 30 Tab 11    fexofenadine (ALLEGRA) 180 mg tablet Take  by mouth.  fluticasone (FLONASE) 50 mcg/actuation nasal spray INSTILL 2 SPRAYS IN EACH NOSTIRL ONCE DAILY 1 Bottle 11    levothyroxine (SYNTHROID) 112 mcg tablet Takes 5 days a week. (Not Monday and Friday) 75 Tab 3    ranitidine (ZANTAC) 150 mg tablet TAKE 1 TABLET BY MOUTH TWICE DAILY FOR STOMACH ACID NEEDS APPOINTMENT 60 Tab 11    Calcium Carbonate-Vit D3-Min 600 mg (1,500 mg)-400 unit chew Take  by mouth.  docusate sodium 100 mg tab Take  by mouth.  polyethylene glycol (MIRALAX) 17 gram packet Take 17 g by mouth.  PREMARIN 0.625 mg/gram vaginal cream USE 0.5 GRAM OF CREAM INTRAVAGINALLY ADMINISTERED TWICE WEEKLY. 30 g 5    ACETAMINOPHEN (TYLENOL ARTHRITIS PAIN PO) Take  by mouth.  memantine (NAMENDA) 10 mg tablet Take 1 Tab by mouth daily. 90 Tab 3     No current facility-administered medications on file prior to visit. Objective:     Vitals:    09/20/17 1036   BP: 136/84   Pulse: (!) 56   Resp: 19   Temp: 98.6 °F (37 °C)   SpO2: 94%   Weight: 149 lb (67.6 kg)   Height: 5' 6\" (1.676 m)     Gen: alert, says only few words, confused, no acute distress  Head: normocephalic, atraumatic  Ears: external auditory canals clear, TMs without erythema or effusion  Eyes: pupils equal round reactive to light, sclera clear, conjunctiva clear  Nose: normal turbinates, no rhinorrhea  Oral: moist mucus membranes, no oral lesions, no pharyngeal inflammation or exudate  Neck: supple, no lymphadenopathy  Resp: no increased work of breathing, lungs clear to ausculation bilaterally  CV: S1, S2 normal, no murmurs, rubs, or gallops. No edema to extremities   Abd: soft, not tender, not distended. No hepatosplenomegaly. Normal bowel sounds. No hernias.    Neuro: cranial nerves intact, normal strength and movement in all extremities, reflexes and sensation intact and symmetric. Skin: no lesion or rash          Assessment/ Plan:   Differential diagnosis and treatment options reviewed with patient who is in agreement with treatment plan as outlined below. ICD-10-CM ICD-9-CM    1. Dementia with behavioral disturbance, unspecified dementia type F03.91 294.21 haloperidol (HALDOL) 0.5 mg tablet         2. Encounter for immunization Z23 V03.89 INFLUENZA VIRUS VACCINE, HIGH DOSE SEASONAL, PRESERVATIVE FREE   3. Recurrent falls R29.6 V15.88    4. Paroxysmal atrial fibrillation (HCC) I48.0 427.31 metoprolol succinate (TOPROL-XL) 25 mg XL tablet   5. Systolic CHF, chronic (HCC) I50.22 428.22 metoprolol succinate (TOPROL-XL) 25 mg XL tablet     428.0    6. Gastroesophageal reflux disease without esophagitis K21.9 530.81      Will complete paper work needed for admission to skilled nursing facility. Awaiting oxygen testing ([employer] overnight  oximetry) results . Refills/correction to doses entered. Seems to have improved on medication changes (Haldol dose increase and adding Zoloft). Flu vaccine today, VIS discussed and copy given in AVS  No changes in current dose of medications. BP at goal.   No anticoagulation due to history of recurrent falls. AFib \"comes and goes\" per caretaker. I have discussed the diagnosis with the patient and the intended plan as seen in the above orders. The patient has received an after-visit summary and questions were answered concerning future plans. I have discussed medication side effects and warnings with the patient as well. The patient verbalizes understanding and agreement with the plan. Follow-up Disposition:  Return if symptoms worsen or fail to improve.

## 2017-09-20 NOTE — PATIENT INSTRUCTIONS
Dementia: Care Instructions  Your Care Instructions  Dementia is a loss of mental skills that affects your daily life. It is different than the occasional trouble with memory that is part of aging. You may find it hard to remember things that you feel you should be able to remember. Or you may feel that your mind is just not working as well as usual.  Finding out that you have dementia is a shock. You may be afraid and worried about how the condition will change your life. Although there is no cure at this time, medicine may slow memory loss and improve thinking for a while. Other medicines may be able to help you sleep or cope with depression and behavior changes. Dementia often gets worse slowly. But it can get worse quickly. As dementia gets worse, it may become harder to do common things that take planning, like making a list and going shopping. Over time, the disease may make it hard for you to take care of yourself. Some people with dementia need others to help care for them. Dementia is different for everyone. You may be able to function well for a long time. In the early stage of the condition, you can do things at home to make life easier and safer. You also can keep doing your hobbies and other activities. Many people find comfort in planning now for their future needs. Follow-up care is a key part of your treatment and safety. Be sure to make and go to all appointments, and call your doctor if you are having problems. Its also a good idea to know your test results and keep a list of the medicines you take. How can you care for yourself at home? · Take your medicines exactly as prescribed. Call your doctor if you think you are having a problem with your medicine. · Eat healthy foods. Eat lots of whole grains, fruits, and vegetables every day.  If you are not hungry, try snacks or nutritional drinks such as Boost, Ensure, or Sustacal.  · If you have problems sleeping:  ¨ Try not to nap too close to your bedtime. ¨ Exercise regularly. Walking is a good choice. ¨ Try a glass of warm milk or caffeine-free herbal tea before bed. · Do tasks and activities during the time of day when you feel your best. It may help to develop a daily routine. · Post labels, lists, and sticky notes to help you remember things. Write your activities on a calendar you can easily find. Put your clock where you can easily see it. · Stay active. Take walks in familiar places, or with friends or loved ones. Try to stay active mentally too. Read and work crossword puzzles if you enjoy these activities. · Do not drive unless you can pass an on-road driving test. If you are not sure if you are safe to drive, your state s license bureau can test you. · Keep a cordless phone and a flashlight with new batteries by your bed. If possible, put a phone in each of the main rooms of your house, or carry a cell phone in case you fall and cannot reach a phone. Or, you can wear a device around your neck or wrist. You push a button that sends a signal for help. Acknowledge your emotions and plan for the future  · Talk openly and honestly with your doctor. · Let yourself grieve. It is common to feel angry, scared, frustrated, anxious, or depressed. · Get emotional support from family, friends, a support group, or a counselor experienced in working with people who have dementia. · Ask for help if you need it. · Plan for the future. ¨ Talk to your family and doctor about preparing a living will and other important papers while you can make decisions. These papers tell your doctors how to care for you at the end of your life. ¨ Consider naming a person to make decisions about your care if you are not able to. When should you call for help? Call 911 anytime you think you may need emergency care. For example, call if:  · You are lost and do not know whom to call. · You are injured and do not know whom to call.   Call your doctor now or seek immediate medical care if:  · You are more confused or upset than usual.  · You feel like you could hurt yourself because your mind is not working well. Watch closely for changes in your health, and be sure to contact your doctor if you have any problems. Where can you learn more? Go to http://rosy-sancho.info/. Enter T412 in the search box to learn more about \"Dementia: Care Instructions. \"  Current as of: July 26, 2016  Content Version: 11.3  © 9052-0620 [employer]. Care instructions adapted under license by [employer] (which disclaims liability or warranty for this information). If you have questions about a medical condition or this instruction, always ask your healthcare professional. [address] any warranty or liability for your use of this information.

## 2017-09-20 NOTE — PROGRESS NOTES
Chief Complaint   Patient presents with    PPD Reading     patient needs ppd reading    Immunization/Injection     flu vaccine    Confidential     patient is here to see Olga Linton for assistant living

## 2017-09-20 NOTE — TELEPHONE ENCOUNTER
cali norris calling to let Francine Blizzard know that atient does not know that she is going to assisted living, daughter is going to tell her in person when she sees her next. Cali wanted to make sure that nobody mentions this when she is seen.  If she needs to be contacted, Cali's number is [phone]

## 2017-09-20 NOTE — MR AVS SNAPSHOT
Visit Information Date & Time Provider Department Dept. Phone Encounter #  
 9/20/2017 10:30 AM Inna Campbell NP [facility] [address] [address] [phone] [id number] Upcoming Health Maintenance Date Due DTaP/Tdap/Td series (1 - Tdap) 2/1/1953 GLAUCOMA SCREENING Q2Y 2/1/1997 INFLUENZA AGE 9 TO ADULT 8/1/2017 MEDICARE YEARLY EXAM 3/8/2018 Allergies as of 9/20/2017  Review Complete On: 9/20/2017 By: Inna Campbell NP Severity Noted Reaction Type Reactions Sulfa (Sulfonamide Antibiotics)  06/21/2016    Not Reported This Time Current Immunizations  Reviewed on 9/18/2017 Name Date Influenza High Dose Vaccine PF 9/20/2017, 10/3/2016 Pneumococcal Conjugate (PCV-13) 6/6/2016 Pneumococcal Polysaccharide (PPSV-23) 11/3/2014 TB Skin Test (PPD) Intradermal 9/18/2017 Zoster Vaccine, Live 12/3/2012 Not reviewed this visit You Were Diagnosed With   
  
 Codes Comments Dementia with behavioral disturbance, unspecified dementia type     ICD-10-CM: F03.91 
ICD-9-CM: 294.21 Encounter for immunization     ICD-10-CM: L44 ICD-9-CM: V03.89 Recurrent falls     ICD-10-CM: R29.6 ICD-9-CM: V15.88 Paroxysmal atrial fibrillation (HCC)     ICD-10-CM: I48.0 ICD-9-CM: 427.31 Systolic CHF, chronic (HCC)     ICD-10-CM: I50.22 ICD-9-CM: 428.22, 428.0 Gastroesophageal reflux disease without esophagitis     ICD-10-CM: K21.9 ICD-9-CM: 530.81 Vitals BP Pulse Temp Resp Height(growth percentile) Weight(growth percentile) 136/84 (BP 1 Location: Left arm, BP Patient Position: Sitting) (!) 56 98.6 °F (37 °C) 19 5' 6\" (1.676 m) 149 lb (67.6 kg) SpO2 BMI OB Status Smoking Status 94% 24.05 kg/m2 Hysterectomy Never Smoker Vitals History BMI and BSA Data Body Mass Index Body Surface Area 24.05 kg/m 2 1.77 m 2 Preferred Pharmacy Pharmacy Name Phone [facility] [address] [phone] Your Updated Medication List  
  
   
This list is accurate as of: 9/20/17 11:59 AM.  Always use your most recent med list.  
  
  
  
  
 Calcium Carbonate-Vit D3-Min 600 mg (1,500 mg)-400 unit Chew Take  by mouth. diclofenac 1 % Gel Commonly known as:  VOLTAREN Apply 4 g to affected area two (2) times a day. docusate sodium 100 mg Tab Take  by mouth. fexofenadine 180 mg tablet Commonly known as:  Angela Emili Take  by mouth. fluticasone 50 mcg/actuation nasal spray Commonly known as:  Madhav Public INSTILL 2 SPRAYS IN EACH NOSTIRL ONCE DAILY  
  
 haloperidol 1 mg tablet Commonly known as:  HALDOL Take 1 Tab by mouth nightly. hydrocortisone 1 % topical cream  
Commonly known as:  CORTAID Apply 1 Each to affected area daily. inside right ear for eczema  
  
 levothyroxine 112 mcg tablet Commonly known as:  SYNTHROID Takes 5 days a week. (Not Monday and Friday)  
  
 lisinopril 2.5 mg tablet Commonly known as:  PRINIVIL, ZESTRIL  
TAKE 1 TABLET BY MOUTH DAILY FOR BLOOD PRESSURE  
  
 meloxicam 7.5 mg tablet Commonly known as:  MOBIC Take 1 Tab by mouth daily. memantine 10 mg tablet Commonly known as:  Yessy Breen Take 1 Tab by mouth daily. metoprolol succinate 25 mg XL tablet Commonly known as:  TOPROL-XL Take 0.5 Tabs by mouth nightly. Replaced Coreg MIRALAX 17 gram packet Generic drug:  polyethylene glycol Take 17 g by mouth. OXYGEN-AIR DELIVERY SYSTEMS  
2 L by Nasal route nightly. PREMARIN 0.625 mg/gram vaginal cream  
Generic drug:  conjugated estrogens Insert 0.5 g into vagina two (2) times a week. on Tuesday & Friday  
  
 raNITIdine 150 mg tablet Commonly known as:  ZANTAC TAKE 1 TABLET BY MOUTH TWICE DAILY FOR STOMACH ACID NEEDS APPOINTMENT  
  
 sertraline 25 mg tablet Commonly known as:  ZOLOFT  
 TAKE 1 TABLET BY MOUTH DAILY FOR MOOD TYLENOL ARTHRITIS PAIN PO Take  by mouth. Prescriptions Sent to Pharmacy Refills  
 haloperidol (HALDOL) 1 mg tablet 1 Sig: Take 1 Tab by mouth nightly. Class: Normal  
 Pharmacy: [address] Ph #: [phone] Route: Oral  
 metoprolol succinate (TOPROL-XL) 25 mg XL tablet 6 Sig: Take 0.5 Tabs by mouth nightly. Replaced Coreg Class: Normal  
 Pharmacy: [address] Ph #: [phone] Route: Oral  
  
We Performed the Following INFLUENZA VIRUS VACCINE, HIGH DOSE SEASONAL, PRESERVATIVE FREE [76736 CPT(R)] Patient Instructions Dementia: Care Instructions Your Care Instructions Dementia is a loss of mental skills that affects your daily life. It is different than the occasional trouble with memory that is part of aging. You may find it hard to remember things that you feel you should be able to remember. Or you may feel that your mind is just not working as well as usual. 
Finding out that you have dementia is a shock. You may be afraid and worried about how the condition will change your life. Although there is no cure at this time, medicine may slow memory loss and improve thinking for a while. Other medicines may be able to help you sleep or cope with depression and behavior changes. Dementia often gets worse slowly. But it can get worse quickly. As dementia gets worse, it may become harder to do common things that take planning, like making a list and going shopping. Over time, the disease may make it hard for you to take care of yourself. Some people with dementia need others to help care for them. Dementia is different for everyone. You may be able to function well for a long time.  In the early stage of the condition, you can do things at home to make life easier and safer. You also can keep doing your hobbies and other activities. Many people find comfort in planning now for their future needs. Follow-up care is a key part of your treatment and safety. Be sure to make and go to all appointments, and call your doctor if you are having problems. Its also a good idea to know your test results and keep a list of the medicines you take. How can you care for yourself at home? · Take your medicines exactly as prescribed. Call your doctor if you think you are having a problem with your medicine. · Eat healthy foods. Eat lots of whole grains, fruits, and vegetables every day. If you are not hungry, try snacks or nutritional drinks such as Boost, Ensure, or Sustacal. 
· If you have problems sleeping: ¨ Try not to nap too close to your bedtime. ¨ Exercise regularly. Walking is a good choice. ¨ Try a glass of warm milk or caffeine-free herbal tea before bed. · Do tasks and activities during the time of day when you feel your best. It may help to develop a daily routine. · Post labels, lists, and sticky notes to help you remember things. Write your activities on a calendar you can easily find. Put your clock where you can easily see it. · Stay active. Take walks in familiar places, or with friends or loved ones. Try to stay active mentally too. Read and work crossword puzzles if you enjoy these activities. · Do not drive unless you can pass an on-road driving test. If you are not sure if you are safe to drive, your state s license bureau can test you. · Keep a cordless phone and a flashlight with new batteries by your bed. If possible, put a phone in each of the main rooms of your house, or carry a cell phone in case you fall and cannot reach a phone. Or, you can wear a device around your neck or wrist. You push a button that sends a signal for help. Acknowledge your emotions and plan for the future · Talk openly and honestly with your doctor. · Let yourself grieve. It is common to feel angry, scared, frustrated, anxious, or depressed. · Get emotional support from family, friends, a support group, or a counselor experienced in working with people who have dementia. · Ask for help if you need it. · Plan for the future. ¨ Talk to your family and doctor about preparing a living will and other important papers while you can make decisions. These papers tell your doctors how to care for you at the end of your life. ¨ Consider naming a person to make decisions about your care if you are not able to. When should you call for help? Call 911 anytime you think you may need emergency care. For example, call if: 
· You are lost and do not know whom to call. · You are injured and do not know whom to call. Call your doctor now or seek immediate medical care if: 
· You are more confused or upset than usual. 
· You feel like you could hurt yourself because your mind is not working well. Watch closely for changes in your health, and be sure to contact your doctor if you have any problems. Where can you learn more? Go to http://rosyHypecalsancho.info/. Enter Z504 in the search box to learn more about \"Dementia: Care Instructions. \" Current as of: July 26, 2016 Content Version: 11.3 © 1007-2296 [employer]. Care instructions adapted under license by [employer] (which disclaims liability or warranty for this information). If you have questions about a medical condition or this instruction, always ask your healthcare professional. [place] [address] any warranty or liability for your use of this information. Introducing \[facility] [place] [facility]\"" & HEALTH SERVICES! Dear Avani Lawrence: Thank you for requesting a [employer] account. Our records indicate that you have previously registered for a [employer] account but its currently inactive. Please call our [employer] support line at [phone]. Additional Information If you have questions, please visit the Frequently Asked Questions section of the [employer] website at [email or web address] Remember, [employer] is NOT to be used for urgent needs. For medical emergencies, dial 911. Now available from your iPhone and Android! Please provide this summary of care documentation to your next provider. Your primary care clinician is listed as Travon Goldsmith. If you have any questions after today's visit, please call [phone].

## 2017-09-25 ENCOUNTER — TELEPHONE (OUTPATIENT)
Dept: FAMILY MEDICINE CLINIC | Age: 82
End: 2017-09-25

## 2017-09-25 RX ORDER — HALOPERIDOL 0.5 MG/1
0.5 TABLET ORAL
Qty: 90 TAB | Refills: 0 | Status: SHIPPED | OUTPATIENT
Start: 2017-09-25

## 2017-09-25 NOTE — TELEPHONE ENCOUNTER
Brady Quispe calling to speak with Zita Began about paperwork that was discussed on Wednesday.  She can be reached at [phone]

## 2017-09-26 ENCOUNTER — DOCUMENTATION ONLY (OUTPATIENT)
Dept: FAMILY MEDICINE CLINIC | Age: 82
End: 2017-09-26

## 2017-09-26 NOTE — PROGRESS NOTES
Received report from overnight oximetry study. No need for supplemental nocturnal  Oxygen was founded.

## 2017-09-26 NOTE — PROGRESS NOTES
Forms faxed to [place] at [facility] at [phone]. Chaya Patricia Notified and verbalized understanding. A copy of form mailed to [facility] for her records.

## 2017-09-29 ENCOUNTER — TELEPHONE (OUTPATIENT)
Dept: FAMILY MEDICINE CLINIC | Age: 82
End: 2017-09-29

## 2017-09-29 NOTE — TELEPHONE ENCOUNTER
Please let her know that I have been speaking to intake employee at the facility and have sent paper work

## 2017-09-29 NOTE — TELEPHONE ENCOUNTER
Daughter would like to speak to Georgi Almaguer NP regarding \"concerns\" for which type of room she will be staying in for wandering/dementia. Please call.

## 2017-09-29 NOTE — TELEPHONE ENCOUNTER
Alex Momin calling to speak to either Keri Oliva or Dr Evon Cook about patient and paperwork. She says when we call back, ask for Ladoris Harvey, patient's daughter (on hipaa). she says this is pretty urgent, as they are moving patient to assisted living on Monday.

## 2017-10-10 NOTE — ACP (ADVANCE CARE PLANNING)
Advance Care Planning Conversation  First Steps®     Prior to today's conversation, did individual have existing ACP documents? [] Yes  [x] No  If Yes, type of document:   Copy of document in electronic health record? [x] Yes  [] No  If no, requested copy of document? [] Yes  [] No    Date of conversation: 3/20/17  [place] [place]   [x] Patient    [x] Prospective agent (not yet named in a document) / Other surrogate decision  maker / next of kin    Name: Artie Sandifer    Relationship to patient:Friend  Phone number: [phone]   [] Healthcare agent (already designated in prior ACP document)    Name:     Relationship to patient:  Phone number:       Other persons present:   Name:     Relationship to patient:   Name:     Relationship to patient:    Individual's Fears/Concerns about planning: none     Goals/Narrative of Conversation: To complete AMD.Met with patient and primary healthcare agent(Jody). Sucessor agent Mara met with us via phone. All questions were answered and directive completed. Conversation topics for Individual    Has learned the following from prior experiences with serious illness:    Identifies the following as important for living well:  \"What cultural, Baptism, spiritual, or personal beliefs (if any) do you have that might help you choose the care you want, or do not want? \"  Patient response:       \"Would you like to talk to someone about these beliefs or concerns? \"  Patient response:     Conversation topics for Agent / Prospective Agent    Understanding of the role of healthcare agent:Jody and Nagi Wright both understand role    Agent confirms Willingness to:   [x]Yes []No Accept role   [x] Yes []No Talk with individual about his/her goals, values, & preferences   [x] Yes [] No   Follow individual's decisions, even if do not agree with those    decisions   [x]Yes  []No Make decisions in difficult moments    Other questions/concerns about role of agent    Topics for Chronic Illness (if applicable): \"What do you understand about your (name of illness)? \"  Patient response:    \"What do you understand about the complications that may occur with your (name of illness)? \"  Patient response:    \"What do you understand about CPR? \" Patient response:    \"What outcome would you expect from CPR? \" Patient response:    \"What would be an unacceptable outcome? \" Patient response:        First Steps® ACP Facilitator: Veronika Mar RN    Length (minutes) 61    The following was provided (check all that apply):   [x] Clarification of the goals of ACP    [x] Criteria for choosing a healthcare agent   [x] Written information on the planning process      Healthcare Decision Information Cards:   [x] Help with Breathing Facts   [x] Tube Feeding Facts   [x] CPR Facts      [x] Review of the completion of the advance directive    [] Review of existing advance directive       Meeting Outcomes:   [x] ACP discussion completed   [x] Advance directive form completed   [x] Original of completed advance directive given to patient   [x] Copy given to healthcare agent    [x] Copy scanned to electronic medical record    If ACP discussion not completed, last interview topic discussed:       Follow-up plan:     [] Schedule follow-up conversation to continue planning   [] Referred individual to provider for additional questions/concerns    [] Individual will invite agent/prospective agent to next ACP appointment   [] Recommended to review completed AMD annually or upon change in health status     [] This note routed to one or more involved healthcare providers

## 2018-09-12 NOTE — TELEPHONE ENCOUNTER
Jody advised that if she feels that Ms. Chavez needs to recheck urine next week that she can stop by the office to  urine cup.
Patient caregiver calling to speak with Fern Watson about update on patient.  She can be reached at [phone]
Patient baseline mental status

## 2019-04-10 NOTE — TELEPHONE ENCOUNTER
I called and spoke to daughter Aj Alex and discussed her admission form for SNF/assisted living, daughter was concerned that we said her mother had a history of falling and wandering and wants me to change the form to not list history of wandering and falling. I explained to her daughter that is unethical and falsifying and I cannot do that. She went on to say that since D/C clonazepam and adding haldol, her mother has not wandered at all in the last 30 days as reported by her caretaker (but her daughter does not live with her). I explained the progression of dementia and that I cannot change her history. The facility intake MD can evaluate Ms Chavez and decide if she is safe to move her to assisted living, but I am not changing the form as this is a safety issue and Ms Arlin Paula needs assistance with all ADL. normal...